# Patient Record
Sex: FEMALE | Race: ASIAN | NOT HISPANIC OR LATINO | URBAN - METROPOLITAN AREA
[De-identification: names, ages, dates, MRNs, and addresses within clinical notes are randomized per-mention and may not be internally consistent; named-entity substitution may affect disease eponyms.]

---

## 2017-09-14 ENCOUNTER — INPATIENT (INPATIENT)
Facility: HOSPITAL | Age: 42
LOS: 2 days | Discharge: HOME | DRG: 690 | End: 2017-09-17
Attending: UROLOGY | Admitting: UROLOGY
Payer: COMMERCIAL

## 2017-09-14 VITALS
SYSTOLIC BLOOD PRESSURE: 169 MMHG | RESPIRATION RATE: 18 BRPM | HEART RATE: 98 BPM | DIASTOLIC BLOOD PRESSURE: 91 MMHG | OXYGEN SATURATION: 95 % | TEMPERATURE: 100 F

## 2017-09-14 DIAGNOSIS — Z98.89 OTHER SPECIFIED POSTPROCEDURAL STATES: Chronic | ICD-10-CM

## 2017-09-14 DIAGNOSIS — Z98.890 OTHER SPECIFIED POSTPROCEDURAL STATES: Chronic | ICD-10-CM

## 2017-09-14 DIAGNOSIS — Z98.891 HISTORY OF UTERINE SCAR FROM PREVIOUS SURGERY: Chronic | ICD-10-CM

## 2017-09-14 LAB
ANION GAP SERPL CALC-SCNC: 17 MMOL/L — SIGNIFICANT CHANGE UP (ref 5–17)
APPEARANCE UR: ABNORMAL
BACTERIA # UR AUTO: ABNORMAL /HPF
BASOPHILS # BLD AUTO: 0 K/UL — SIGNIFICANT CHANGE UP (ref 0–0.2)
BASOPHILS NFR BLD AUTO: 0.2 % — SIGNIFICANT CHANGE UP (ref 0–2)
BILIRUB UR-MCNC: NEGATIVE — SIGNIFICANT CHANGE UP
BUN SERPL-MCNC: 8 MG/DL — SIGNIFICANT CHANGE UP (ref 7–23)
CALCIUM SERPL-MCNC: 10 MG/DL — SIGNIFICANT CHANGE UP (ref 8.4–10.5)
CHLORIDE SERPL-SCNC: 99 MMOL/L — SIGNIFICANT CHANGE UP (ref 96–108)
CO2 SERPL-SCNC: 23 MMOL/L — SIGNIFICANT CHANGE UP (ref 22–31)
COLOR SPEC: SIGNIFICANT CHANGE UP
COMMENT - URINE: SIGNIFICANT CHANGE UP
CREAT SERPL-MCNC: 0.6 MG/DL — SIGNIFICANT CHANGE UP (ref 0.5–1.3)
DIFF PNL FLD: ABNORMAL
EOSINOPHIL # BLD AUTO: 0.2 K/UL — SIGNIFICANT CHANGE UP (ref 0–0.5)
EOSINOPHIL NFR BLD AUTO: 1.2 % — SIGNIFICANT CHANGE UP (ref 0–6)
EPI CELLS # UR: SIGNIFICANT CHANGE UP /HPF
GLUCOSE SERPL-MCNC: 164 MG/DL — HIGH (ref 70–99)
GLUCOSE UR QL: 1000
HCT VFR BLD CALC: 35.5 % — SIGNIFICANT CHANGE UP (ref 34.5–45)
HGB BLD-MCNC: 11.8 G/DL — SIGNIFICANT CHANGE UP (ref 11.5–15.5)
KETONES UR-MCNC: NEGATIVE — SIGNIFICANT CHANGE UP
LEUKOCYTE ESTERASE UR-ACNC: ABNORMAL
LYMPHOCYTES # BLD AUTO: 13.9 % — SIGNIFICANT CHANGE UP (ref 13–44)
LYMPHOCYTES # BLD AUTO: 3 K/UL — SIGNIFICANT CHANGE UP (ref 1–3.3)
MCHC RBC-ENTMCNC: 27.1 PG — SIGNIFICANT CHANGE UP (ref 27–34)
MCHC RBC-ENTMCNC: 33.3 GM/DL — SIGNIFICANT CHANGE UP (ref 32–36)
MCV RBC AUTO: 81.6 FL — SIGNIFICANT CHANGE UP (ref 80–100)
MONOCYTES # BLD AUTO: 1 K/UL — HIGH (ref 0–0.9)
MONOCYTES NFR BLD AUTO: 4.9 % — SIGNIFICANT CHANGE UP (ref 2–14)
NEUTROPHILS # BLD AUTO: 16.9 K/UL — HIGH (ref 1.8–7.4)
NEUTROPHILS NFR BLD AUTO: 79.8 % — HIGH (ref 43–77)
NITRITE UR-MCNC: NEGATIVE — SIGNIFICANT CHANGE UP
PH UR: 6 — SIGNIFICANT CHANGE UP (ref 5–8)
PLATELET # BLD AUTO: 335 K/UL — SIGNIFICANT CHANGE UP (ref 150–400)
POTASSIUM SERPL-MCNC: 4.3 MMOL/L — SIGNIFICANT CHANGE UP (ref 3.5–5.3)
POTASSIUM SERPL-SCNC: 4.3 MMOL/L — SIGNIFICANT CHANGE UP (ref 3.5–5.3)
PROT UR-MCNC: 30 MG/DL
RBC # BLD: 4.35 M/UL — SIGNIFICANT CHANGE UP (ref 3.8–5.2)
RBC # FLD: 13.4 % — SIGNIFICANT CHANGE UP (ref 10.3–14.5)
RBC CASTS # UR COMP ASSIST: ABNORMAL /HPF (ref 0–2)
SODIUM SERPL-SCNC: 139 MMOL/L — SIGNIFICANT CHANGE UP (ref 135–145)
SP GR SPEC: 1.01 — LOW (ref 1.01–1.02)
UROBILINOGEN FLD QL: NEGATIVE — SIGNIFICANT CHANGE UP
WBC # BLD: 21.2 K/UL — HIGH (ref 3.8–10.5)
WBC # FLD AUTO: 21.2 K/UL — HIGH (ref 3.8–10.5)
WBC UR QL: ABNORMAL /HPF (ref 0–5)

## 2017-09-14 PROCEDURE — 74176 CT ABD & PELVIS W/O CONTRAST: CPT | Mod: 26

## 2017-09-14 PROCEDURE — 99285 EMERGENCY DEPT VISIT HI MDM: CPT

## 2017-09-14 RX ORDER — FAMOTIDINE 10 MG/ML
20 INJECTION INTRAVENOUS ONCE
Qty: 0 | Refills: 0 | Status: COMPLETED | OUTPATIENT
Start: 2017-09-14 | End: 2017-09-14

## 2017-09-14 RX ORDER — CEFTRIAXONE 500 MG/1
1 INJECTION, POWDER, FOR SOLUTION INTRAMUSCULAR; INTRAVENOUS ONCE
Qty: 0 | Refills: 0 | Status: COMPLETED | OUTPATIENT
Start: 2017-09-14 | End: 2017-09-14

## 2017-09-14 RX ORDER — KETOROLAC TROMETHAMINE 30 MG/ML
15 SYRINGE (ML) INJECTION ONCE
Qty: 0 | Refills: 0 | Status: DISCONTINUED | OUTPATIENT
Start: 2017-09-14 | End: 2017-09-14

## 2017-09-14 RX ORDER — SODIUM CHLORIDE 9 MG/ML
1000 INJECTION INTRAMUSCULAR; INTRAVENOUS; SUBCUTANEOUS ONCE
Qty: 0 | Refills: 0 | Status: COMPLETED | OUTPATIENT
Start: 2017-09-14 | End: 2017-09-14

## 2017-09-14 RX ADMIN — CEFTRIAXONE 100 GRAM(S): 500 INJECTION, POWDER, FOR SOLUTION INTRAMUSCULAR; INTRAVENOUS at 23:49

## 2017-09-14 RX ADMIN — FAMOTIDINE 20 MILLIGRAM(S): 10 INJECTION INTRAVENOUS at 22:10

## 2017-09-14 RX ADMIN — Medication 15 MILLIGRAM(S): at 22:10

## 2017-09-14 RX ADMIN — SODIUM CHLORIDE 1000 MILLILITER(S): 9 INJECTION INTRAMUSCULAR; INTRAVENOUS; SUBCUTANEOUS at 22:11

## 2017-09-14 RX ADMIN — Medication 15 MILLIGRAM(S): at 23:49

## 2017-09-14 NOTE — ED PROVIDER NOTE - CARE PLAN
Principal Discharge DX:	Kidney stones Principal Discharge DX:	Kidney stones  Secondary Diagnosis:	Acute cystitis with hematuria

## 2017-09-14 NOTE — ED PROVIDER NOTE - PROGRESS NOTE DETAILS
Angelica: Patient wbc elevated, ua shows wbc, + leuk esterace, +bacteria. + rbc. will consult urology. ct scan to evaluate for infected stone if need for possible further intervention.

## 2017-09-14 NOTE — ED PROVIDER NOTE - MEDICAL DECISION MAKING DETAILS
Angelica: patient with left flank pain radiating to the groin x 5 days worse today. Had blood in urine 2 weeks ago but now with dysuria. + subjective fevers at home. History of stones in the past. No vaginal discharge. will get labs, ua, U/S reassess.

## 2017-09-14 NOTE — ED ADULT NURSE NOTE - OBJECTIVE STATEMENT
patient presented to the ED w left side flank pain, symptoms intermittent for five days now but worsens today. reports some nausea, no vomiting. radiate to suprapubic area.

## 2017-09-14 NOTE — ED PROVIDER NOTE - OBJECTIVE STATEMENT
42 year old female w DM, Abdominal hernia, prior kidney stone reports 5 day onset of left flank pain radiating to the left groin. She also admits to nausea which is chronic and intermittent associated with the sensation of smelling smoke and frequent belching. She denies fever or chills. No other complaints. Denies dysuria.

## 2017-09-15 ENCOUNTER — TRANSCRIPTION ENCOUNTER (OUTPATIENT)
Age: 42
End: 2017-09-15

## 2017-09-15 DIAGNOSIS — N20.0 CALCULUS OF KIDNEY: ICD-10-CM

## 2017-09-15 LAB
ANION GAP SERPL CALC-SCNC: 13 MMOL/L — SIGNIFICANT CHANGE UP (ref 5–17)
BASOPHILS # BLD AUTO: 0.1 K/UL — SIGNIFICANT CHANGE UP (ref 0–0.2)
BASOPHILS NFR BLD AUTO: 0.4 % — SIGNIFICANT CHANGE UP (ref 0–2)
BUN SERPL-MCNC: 8 MG/DL — SIGNIFICANT CHANGE UP (ref 7–23)
CALCIUM SERPL-MCNC: 9.1 MG/DL — SIGNIFICANT CHANGE UP (ref 8.4–10.5)
CHLORIDE SERPL-SCNC: 103 MMOL/L — SIGNIFICANT CHANGE UP (ref 96–108)
CO2 SERPL-SCNC: 23 MMOL/L — SIGNIFICANT CHANGE UP (ref 22–31)
CREAT SERPL-MCNC: 0.57 MG/DL — SIGNIFICANT CHANGE UP (ref 0.5–1.3)
EOSINOPHIL # BLD AUTO: 0.3 K/UL — SIGNIFICANT CHANGE UP (ref 0–0.5)
EOSINOPHIL NFR BLD AUTO: 1.6 % — SIGNIFICANT CHANGE UP (ref 0–6)
GLUCOSE SERPL-MCNC: 158 MG/DL — HIGH (ref 70–99)
HCT VFR BLD CALC: 31.4 % — LOW (ref 34.5–45)
HCT VFR BLD CALC: 33.1 % — LOW (ref 34.5–45)
HGB BLD-MCNC: 10.3 G/DL — LOW (ref 11.5–15.5)
HGB BLD-MCNC: 10.9 G/DL — LOW (ref 11.5–15.5)
LYMPHOCYTES # BLD AUTO: 17.7 % — SIGNIFICANT CHANGE UP (ref 13–44)
LYMPHOCYTES # BLD AUTO: 2.8 K/UL — SIGNIFICANT CHANGE UP (ref 1–3.3)
MCHC RBC-ENTMCNC: 25.9 PG — LOW (ref 27–34)
MCHC RBC-ENTMCNC: 27.1 PG — SIGNIFICANT CHANGE UP (ref 27–34)
MCHC RBC-ENTMCNC: 32.8 GM/DL — SIGNIFICANT CHANGE UP (ref 32–36)
MCHC RBC-ENTMCNC: 33 GM/DL — SIGNIFICANT CHANGE UP (ref 32–36)
MCV RBC AUTO: 78.9 FL — LOW (ref 80–100)
MCV RBC AUTO: 82.1 FL — SIGNIFICANT CHANGE UP (ref 80–100)
MONOCYTES # BLD AUTO: 1.1 K/UL — HIGH (ref 0–0.9)
MONOCYTES NFR BLD AUTO: 6.8 % — SIGNIFICANT CHANGE UP (ref 2–14)
NEUTROPHILS # BLD AUTO: 11.7 K/UL — HIGH (ref 1.8–7.4)
NEUTROPHILS NFR BLD AUTO: 73.5 % — SIGNIFICANT CHANGE UP (ref 43–77)
PLATELET # BLD AUTO: 301 K/UL — SIGNIFICANT CHANGE UP (ref 150–400)
POTASSIUM SERPL-MCNC: 3.9 MMOL/L — SIGNIFICANT CHANGE UP (ref 3.5–5.3)
POTASSIUM SERPL-SCNC: 3.9 MMOL/L — SIGNIFICANT CHANGE UP (ref 3.5–5.3)
RBC # BLD: 3.98 M/UL — SIGNIFICANT CHANGE UP (ref 3.8–5.2)
RBC # BLD: 4.04 M/UL — SIGNIFICANT CHANGE UP (ref 3.8–5.2)
RBC # FLD: 13.4 % — SIGNIFICANT CHANGE UP (ref 10.3–14.5)
RBC # FLD: 15 % — HIGH (ref 10.3–14.5)
SODIUM SERPL-SCNC: 139 MMOL/L — SIGNIFICANT CHANGE UP (ref 135–145)
WBC # BLD: 15.89 K/UL — HIGH (ref 3.8–10.5)
WBC # BLD: 15.9 K/UL — HIGH (ref 3.8–10.5)
WBC # FLD AUTO: 15.89 K/UL — HIGH (ref 3.8–10.5)
WBC # FLD AUTO: 15.9 K/UL — HIGH (ref 3.8–10.5)

## 2017-09-15 PROCEDURE — 52332 CYSTOSCOPY AND TREATMENT: CPT | Mod: LT

## 2017-09-15 PROCEDURE — 76775 US EXAM ABDO BACK WALL LIM: CPT | Mod: 26

## 2017-09-15 RX ORDER — ACETAMINOPHEN 500 MG
650 TABLET ORAL EVERY 6 HOURS
Qty: 0 | Refills: 0 | Status: DISCONTINUED | OUTPATIENT
Start: 2017-09-15 | End: 2017-09-17

## 2017-09-15 RX ORDER — ACETAMINOPHEN 500 MG
650 TABLET ORAL EVERY 6 HOURS
Qty: 0 | Refills: 0 | Status: DISCONTINUED | OUTPATIENT
Start: 2017-09-15 | End: 2017-09-15

## 2017-09-15 RX ORDER — DEXTROSE 50 % IN WATER 50 %
1 SYRINGE (ML) INTRAVENOUS ONCE
Qty: 0 | Refills: 0 | Status: DISCONTINUED | OUTPATIENT
Start: 2017-09-15 | End: 2017-09-15

## 2017-09-15 RX ORDER — DEXTROSE 50 % IN WATER 50 %
25 SYRINGE (ML) INTRAVENOUS ONCE
Qty: 0 | Refills: 0 | Status: DISCONTINUED | OUTPATIENT
Start: 2017-09-15 | End: 2017-09-17

## 2017-09-15 RX ORDER — INSULIN LISPRO 100/ML
VIAL (ML) SUBCUTANEOUS
Qty: 0 | Refills: 0 | Status: DISCONTINUED | OUTPATIENT
Start: 2017-09-15 | End: 2017-09-15

## 2017-09-15 RX ORDER — HYDROMORPHONE HYDROCHLORIDE 2 MG/ML
0.25 INJECTION INTRAMUSCULAR; INTRAVENOUS; SUBCUTANEOUS
Qty: 0 | Refills: 0 | Status: DISCONTINUED | OUTPATIENT
Start: 2017-09-15 | End: 2017-09-15

## 2017-09-15 RX ORDER — INSULIN LISPRO 100/ML
VIAL (ML) SUBCUTANEOUS
Qty: 0 | Refills: 0 | Status: DISCONTINUED | OUTPATIENT
Start: 2017-09-15 | End: 2017-09-17

## 2017-09-15 RX ORDER — SODIUM CHLORIDE 9 MG/ML
1000 INJECTION INTRAMUSCULAR; INTRAVENOUS; SUBCUTANEOUS
Qty: 0 | Refills: 0 | Status: DISCONTINUED | OUTPATIENT
Start: 2017-09-15 | End: 2017-09-16

## 2017-09-15 RX ORDER — SODIUM CHLORIDE 9 MG/ML
1000 INJECTION, SOLUTION INTRAVENOUS
Qty: 0 | Refills: 0 | Status: DISCONTINUED | OUTPATIENT
Start: 2017-09-15 | End: 2017-09-15

## 2017-09-15 RX ORDER — GLUCAGON INJECTION, SOLUTION 0.5 MG/.1ML
1 INJECTION, SOLUTION SUBCUTANEOUS ONCE
Qty: 0 | Refills: 0 | Status: DISCONTINUED | OUTPATIENT
Start: 2017-09-15 | End: 2017-09-15

## 2017-09-15 RX ORDER — MORPHINE SULFATE 50 MG/1
4 CAPSULE, EXTENDED RELEASE ORAL EVERY 4 HOURS
Qty: 0 | Refills: 0 | Status: DISCONTINUED | OUTPATIENT
Start: 2017-09-15 | End: 2017-09-15

## 2017-09-15 RX ORDER — DEXTROSE 50 % IN WATER 50 %
1 SYRINGE (ML) INTRAVENOUS ONCE
Qty: 0 | Refills: 0 | Status: DISCONTINUED | OUTPATIENT
Start: 2017-09-15 | End: 2017-09-17

## 2017-09-15 RX ORDER — GLUCAGON INJECTION, SOLUTION 0.5 MG/.1ML
1 INJECTION, SOLUTION SUBCUTANEOUS ONCE
Qty: 0 | Refills: 0 | Status: DISCONTINUED | OUTPATIENT
Start: 2017-09-15 | End: 2017-09-17

## 2017-09-15 RX ORDER — HEPARIN SODIUM 5000 [USP'U]/ML
5000 INJECTION INTRAVENOUS; SUBCUTANEOUS EVERY 8 HOURS
Qty: 0 | Refills: 0 | Status: DISCONTINUED | OUTPATIENT
Start: 2017-09-15 | End: 2017-09-17

## 2017-09-15 RX ORDER — DEXTROSE 50 % IN WATER 50 %
12.5 SYRINGE (ML) INTRAVENOUS ONCE
Qty: 0 | Refills: 0 | Status: DISCONTINUED | OUTPATIENT
Start: 2017-09-15 | End: 2017-09-17

## 2017-09-15 RX ORDER — TAMSULOSIN HYDROCHLORIDE 0.4 MG/1
0.4 CAPSULE ORAL AT BEDTIME
Qty: 0 | Refills: 0 | Status: DISCONTINUED | OUTPATIENT
Start: 2017-09-15 | End: 2017-09-15

## 2017-09-15 RX ORDER — DEXTROSE 50 % IN WATER 50 %
25 SYRINGE (ML) INTRAVENOUS ONCE
Qty: 0 | Refills: 0 | Status: DISCONTINUED | OUTPATIENT
Start: 2017-09-15 | End: 2017-09-15

## 2017-09-15 RX ORDER — SODIUM CHLORIDE 9 MG/ML
1000 INJECTION, SOLUTION INTRAVENOUS
Qty: 0 | Refills: 0 | Status: DISCONTINUED | OUTPATIENT
Start: 2017-09-15 | End: 2017-09-17

## 2017-09-15 RX ORDER — CEFTRIAXONE 500 MG/1
1 INJECTION, POWDER, FOR SOLUTION INTRAMUSCULAR; INTRAVENOUS EVERY 24 HOURS
Qty: 0 | Refills: 0 | Status: DISCONTINUED | OUTPATIENT
Start: 2017-09-15 | End: 2017-09-15

## 2017-09-15 RX ORDER — INSULIN LISPRO 100/ML
VIAL (ML) SUBCUTANEOUS AT BEDTIME
Qty: 0 | Refills: 0 | Status: DISCONTINUED | OUTPATIENT
Start: 2017-09-15 | End: 2017-09-17

## 2017-09-15 RX ORDER — MORPHINE SULFATE 50 MG/1
4 CAPSULE, EXTENDED RELEASE ORAL EVERY 4 HOURS
Qty: 0 | Refills: 0 | Status: DISCONTINUED | OUTPATIENT
Start: 2017-09-15 | End: 2017-09-17

## 2017-09-15 RX ORDER — OXYCODONE AND ACETAMINOPHEN 5; 325 MG/1; MG/1
2 TABLET ORAL EVERY 4 HOURS
Qty: 0 | Refills: 0 | Status: DISCONTINUED | OUTPATIENT
Start: 2017-09-15 | End: 2017-09-15

## 2017-09-15 RX ORDER — INSULIN LISPRO 100/ML
VIAL (ML) SUBCUTANEOUS AT BEDTIME
Qty: 0 | Refills: 0 | Status: DISCONTINUED | OUTPATIENT
Start: 2017-09-15 | End: 2017-09-15

## 2017-09-15 RX ORDER — DEXTROSE 50 % IN WATER 50 %
12.5 SYRINGE (ML) INTRAVENOUS ONCE
Qty: 0 | Refills: 0 | Status: DISCONTINUED | OUTPATIENT
Start: 2017-09-15 | End: 2017-09-15

## 2017-09-15 RX ORDER — ONDANSETRON 8 MG/1
4 TABLET, FILM COATED ORAL ONCE
Qty: 0 | Refills: 0 | Status: DISCONTINUED | OUTPATIENT
Start: 2017-09-15 | End: 2017-09-15

## 2017-09-15 RX ORDER — SODIUM CHLORIDE 9 MG/ML
1000 INJECTION INTRAMUSCULAR; INTRAVENOUS; SUBCUTANEOUS
Qty: 0 | Refills: 0 | Status: DISCONTINUED | OUTPATIENT
Start: 2017-09-15 | End: 2017-09-15

## 2017-09-15 RX ADMIN — MORPHINE SULFATE 4 MILLIGRAM(S): 50 CAPSULE, EXTENDED RELEASE ORAL at 07:39

## 2017-09-15 RX ADMIN — Medication 1: at 23:27

## 2017-09-15 RX ADMIN — HEPARIN SODIUM 5000 UNIT(S): 5000 INJECTION INTRAVENOUS; SUBCUTANEOUS at 23:27

## 2017-09-15 RX ADMIN — MORPHINE SULFATE 4 MILLIGRAM(S): 50 CAPSULE, EXTENDED RELEASE ORAL at 23:50

## 2017-09-15 RX ADMIN — MORPHINE SULFATE 4 MILLIGRAM(S): 50 CAPSULE, EXTENDED RELEASE ORAL at 23:20

## 2017-09-15 RX ADMIN — SODIUM CHLORIDE 75 MILLILITER(S): 9 INJECTION INTRAMUSCULAR; INTRAVENOUS; SUBCUTANEOUS at 03:18

## 2017-09-15 RX ADMIN — Medication 1: at 16:41

## 2017-09-15 RX ADMIN — MORPHINE SULFATE 4 MILLIGRAM(S): 50 CAPSULE, EXTENDED RELEASE ORAL at 08:00

## 2017-09-15 NOTE — DISCHARGE NOTE ADULT - MEDICATION SUMMARY - MEDICATIONS TO TAKE
I will START or STAY ON the medications listed below when I get home from the hospital:    sulfamethoxazole-trimethoprim 800 mg-160 mg oral tablet  -- 1 tab(s) by mouth 2 times a day  -- Indication: For antibiotic

## 2017-09-15 NOTE — PROGRESS NOTE ADULT - PROBLEM SELECTOR PLAN 1
-regular diet  -monitor I's and O's   -watch for fevers  -OOB, DVT prophylaxis  -incentive spirometry

## 2017-09-15 NOTE — H&P ADULT - ASSESSMENT
41 y/o female PMHx DM with renal colic secondary to 9mm right upj calculus    Plan:    admit gu  f/u ucx  cont iv abx  npo  am labs  possible OR today

## 2017-09-15 NOTE — DISCHARGE NOTE ADULT - CARE PLAN
Principal Discharge DX:	Kidney stones  Goal:	you had a stent placed  Instructions for follow-up, activity and diet:	Call the office if you experience fever, chills, uncontrolled pain, the inability to tolerate liquids, or the urine does not flow  Please follow up with Dr. Quispe in one week for further stent and stone management Principal Discharge DX:	Kidney stones  Goal:	you had a stent placed  Instructions for follow-up, activity and diet:	Call the office if you experience fever, chills, uncontrolled pain, the inability to tolerate liquids, or the urine does not flow.   Please follow up with Dr. Quispe in one week for further stent and stone management

## 2017-09-15 NOTE — H&P ADULT - HISTORY OF PRESENT ILLNESS
41 y/o female PMHx DM, nephrolithiasis presents to ED c/o 5 day history of left flank pain.  +nausea, no emesis  +subjective fever, and +chills  pt admits to some dysuria x today and gross hematuria few weeks ago that resolved spontaneously.  pt with one prior episode of renal colic back in 2014, no surgical intervention, was able to pass stone on her own.

## 2017-09-15 NOTE — BRIEF OPERATIVE NOTE - PROCEDURE
<<-----Click on this checkbox to enter Procedure Cystoscopy with insertion of ureteral stent  09/15/2017    Active  [id number]

## 2017-09-15 NOTE — DISCHARGE NOTE ADULT - PLAN OF CARE
you had a stent placed Call the office if you experience fever, chills, uncontrolled pain, the inability to tolerate liquids, or the urine does not flow  Please follow up with Dr. Quispe in one week for further stent and stone management Call the office if you experience fever, chills, uncontrolled pain, the inability to tolerate liquids, or the urine does not flow.   Please follow up with Dr. Quispe in one week for further stent and stone management

## 2017-09-15 NOTE — DISCHARGE NOTE ADULT - CARE PROVIDER_API CALL
Colt Quispe (MD), Urology  [address] [place] [address]  [address] [place] [address]  Phone: [phone]  Fax: [phone]

## 2017-09-15 NOTE — DISCHARGE NOTE ADULT - HOSPITAL COURSE
pt is a 41 yo f with a pmh of dm2 and nephrolithiasis who arrived at [facility] on 9/14 with left renal colic and unrelenting pain. She required a ureteral stent placement and was instructed to fu for further stent and stone management pt is a 41 yo f with a pmh of dm2 and nephrolithiasis who arrived at [facility] on 9/14 with left renal colic and unrelenting pain. She required a ureteral stent placement and was instructed to fu for further stent and stone management . AVSS. d/c with 7days bactrim for staph in urine.

## 2017-09-15 NOTE — PROGRESS NOTE ADULT - SUBJECTIVE AND OBJECTIVE BOX
The patient was seen and examined at bedside.  Admits to dysuria.  She denies complaints of chest pain, shortness of breath, nausea, acute pain.    T(C): 37.2 (09-15-17 @ 20:32), Max: 37.6 (09-14-17 @ 23:07)  HR: 85 (09-15-17 @ 20:32) (80 - 107)  BP: 116/73 (09-15-17 @ 20:32) (109/71 - 148/84)  RR: 18 (09-15-17 @ 20:32) (15 - 20)  SpO2: 94% (09-15-17 @ 20:32) (93% - 99%)  Wt(kg): --    Physical Exam:    General: NAD, A+Ox3  Abdomen: soft, non-tender, non-distended      09-14 @ 07:01  -  09-15 @ 07:00  --------------------------------------------------------  IN: 1000 mL / OUT: 0 mL / NET: 1000 mL    09-15 @ 07:01  -  09-15 @ 21:00  --------------------------------------------------------  IN: 300 mL / OUT: 1300 mL / NET: -1000 mL    voiding                          10.3   15.89 )-----------( x        ( 15 Sep 2017 07:18 )             31.4       09-15    139  |  103  |  8   ----------------------------<  158<H>  3.9   |  23  |  0.57    Ca    9.1      15 Sep 2017 05:11

## 2017-09-15 NOTE — H&P ADULT - NSHPLABSRESULTS_GEN_ALL_CORE
11.8   21.2  )-----------( 335      ( 14 Sep 2017 22:08 )             35.5     09-14    139  |  99  |  8   ----------------------------<  164<H>  4.3   |  23  |  0.60    Ca    10.0      14 Sep 2017 22:08        CT: 9mm L upj calculus with hydronephrosis

## 2017-09-15 NOTE — DISCHARGE NOTE ADULT - ADDITIONAL INSTRUCTIONS
complete entire course of antibiotics.   You must follow up as an outpatient for definitive stone management and stent removal. call for appt.

## 2017-09-15 NOTE — DISCHARGE NOTE ADULT - PATIENT PORTAL LINK FT
“You can access the [facility] Patient Portal, offered by [facility], by registering with the following website: [email or web address]”

## 2017-09-15 NOTE — ED ADULT NURSE REASSESSMENT NOTE - NS ED NURSE REASSESS COMMENT FT1
patient reassessed. no complaint of pain at this time. plan of care reviewed with patient, antibiotics administered as ordered.
patient observed resting in bed. patient is axo3. patient is able to verbalize need. no indication of pain or discomfort. VS reassessed. VS stable. IV fluid infusing as ordered via pump. AM labs drawn and sent to the lab. plan of care reviewed with patient. patient is awaiting bed in inpatient unit.

## 2017-09-16 ENCOUNTER — TRANSCRIPTION ENCOUNTER (OUTPATIENT)
Age: 42
End: 2017-09-16

## 2017-09-16 LAB
-  AMPICILLIN/SULBACTAM: SIGNIFICANT CHANGE UP
-  CEFAZOLIN: SIGNIFICANT CHANGE UP
-  CIPROFLOXACIN: SIGNIFICANT CHANGE UP
-  GENTAMICIN: SIGNIFICANT CHANGE UP
-  LEVOFLOXACIN: SIGNIFICANT CHANGE UP
-  NITROFURANTOIN: SIGNIFICANT CHANGE UP
-  OXACILLIN: SIGNIFICANT CHANGE UP
-  PENICILLIN: SIGNIFICANT CHANGE UP
-  RIFAMPIN: SIGNIFICANT CHANGE UP
-  TETRACYCLINE: SIGNIFICANT CHANGE UP
-  TRIMETHOPRIM/SULFAMETHOXAZOLE: SIGNIFICANT CHANGE UP
-  VANCOMYCIN: SIGNIFICANT CHANGE UP
BASOPHILS # BLD AUTO: 0.01 K/UL — SIGNIFICANT CHANGE UP (ref 0–0.2)
BASOPHILS NFR BLD AUTO: 0.1 % — SIGNIFICANT CHANGE UP (ref 0–2)
CULTURE RESULTS: SIGNIFICANT CHANGE UP
EOSINOPHIL # BLD AUTO: 0.01 K/UL — SIGNIFICANT CHANGE UP (ref 0–0.5)
EOSINOPHIL NFR BLD AUTO: 0.1 % — SIGNIFICANT CHANGE UP (ref 0–6)
HBA1C BLD-MCNC: 7.2 % — HIGH (ref 4–5.6)
HCT VFR BLD CALC: 31.8 % — LOW (ref 34.5–45)
HGB BLD-MCNC: 10.2 G/DL — LOW (ref 11.5–15.5)
IMM GRANULOCYTES NFR BLD AUTO: 0.3 % — SIGNIFICANT CHANGE UP (ref 0–1.5)
LYMPHOCYTES # BLD AUTO: 1.69 K/UL — SIGNIFICANT CHANGE UP (ref 1–3.3)
LYMPHOCYTES # BLD AUTO: 11.3 % — LOW (ref 13–44)
MCHC RBC-ENTMCNC: 25.6 PG — LOW (ref 27–34)
MCHC RBC-ENTMCNC: 32.1 GM/DL — SIGNIFICANT CHANGE UP (ref 32–36)
MCV RBC AUTO: 79.9 FL — LOW (ref 80–100)
METHOD TYPE: SIGNIFICANT CHANGE UP
MONOCYTES # BLD AUTO: 0.74 K/UL — SIGNIFICANT CHANGE UP (ref 0–0.9)
MONOCYTES NFR BLD AUTO: 5 % — SIGNIFICANT CHANGE UP (ref 2–14)
NEUTROPHILS # BLD AUTO: 12.43 K/UL — HIGH (ref 1.8–7.4)
NEUTROPHILS NFR BLD AUTO: 83.2 % — HIGH (ref 43–77)
ORGANISM # SPEC MICROSCOPIC CNT: SIGNIFICANT CHANGE UP
ORGANISM # SPEC MICROSCOPIC CNT: SIGNIFICANT CHANGE UP
PLATELET # BLD AUTO: 334 K/UL — SIGNIFICANT CHANGE UP (ref 150–400)
RBC # BLD: 3.98 M/UL — SIGNIFICANT CHANGE UP (ref 3.8–5.2)
RBC # FLD: 14.8 % — HIGH (ref 10.3–14.5)
SPECIMEN SOURCE: SIGNIFICANT CHANGE UP
WBC # BLD: 14.92 K/UL — HIGH (ref 3.8–10.5)
WBC # FLD AUTO: 14.92 K/UL — HIGH (ref 3.8–10.5)

## 2017-09-16 RX ORDER — DOCUSATE SODIUM 100 MG
100 CAPSULE ORAL THREE TIMES A DAY
Qty: 0 | Refills: 0 | Status: DISCONTINUED | OUTPATIENT
Start: 2017-09-16 | End: 2017-09-17

## 2017-09-16 RX ORDER — SENNA PLUS 8.6 MG/1
2 TABLET ORAL AT BEDTIME
Qty: 0 | Refills: 0 | Status: DISCONTINUED | OUTPATIENT
Start: 2017-09-16 | End: 2017-09-17

## 2017-09-16 RX ORDER — POLYETHYLENE GLYCOL 3350 17 G/17G
17 POWDER, FOR SOLUTION ORAL ONCE
Qty: 0 | Refills: 0 | Status: COMPLETED | OUTPATIENT
Start: 2017-09-16 | End: 2017-09-16

## 2017-09-16 RX ADMIN — Medication 100 MILLIGRAM(S): at 22:02

## 2017-09-16 RX ADMIN — SENNA PLUS 2 TABLET(S): 8.6 TABLET ORAL at 22:02

## 2017-09-16 RX ADMIN — HEPARIN SODIUM 5000 UNIT(S): 5000 INJECTION INTRAVENOUS; SUBCUTANEOUS at 13:17

## 2017-09-16 RX ADMIN — Medication 1 TABLET(S): at 17:35

## 2017-09-16 RX ADMIN — Medication 1 TABLET(S): at 05:20

## 2017-09-16 RX ADMIN — Medication 1: at 17:34

## 2017-09-16 RX ADMIN — HEPARIN SODIUM 5000 UNIT(S): 5000 INJECTION INTRAVENOUS; SUBCUTANEOUS at 22:02

## 2017-09-16 RX ADMIN — Medication 1: at 08:48

## 2017-09-16 RX ADMIN — HEPARIN SODIUM 5000 UNIT(S): 5000 INJECTION INTRAVENOUS; SUBCUTANEOUS at 05:20

## 2017-09-16 NOTE — PROGRESS NOTE ADULT - ASSESSMENT
42y Female s/p cystoscopy, left ureteral stent placement for left obstructing ureteral stone and hydronephrosis  -regular diet  -monitor I's and O's   -trend fevers  -f/u culture results.  -OOB, DVT prophylaxis  -incentive spirometry  -d/c planning

## 2017-09-16 NOTE — PROGRESS NOTE ADULT - SUBJECTIVE AND OBJECTIVE BOX
The patient was seen and examined at bedside. Feels well this AM. No complaints currently. No fevers o/n, no n/v.    T(C): 36.6 (09-16-17 @ 04:33), Max: 37.4 (09-15-17 @ 12:54)  HR: 77 (09-16-17 @ 04:33) (77 - 107)  BP: 122/67 (09-16-17 @ 04:33) (109/71 - 147/73)  ABP: --  ABP(mean): --  RR: 18 (09-16-17 @ 04:33) (15 - 20)  SpO2: 94% (09-16-17 @ 04:33) (93% - 99%)  Wt(kg): --  CVP(mm Hg): --      09-15 @ 07:01  -  09-16 @ 07:00  --------------------------------------------------------  IN:    Oral Fluid: 320 mL    sodium chloride 0.9%.: 300 mL  Total IN: 620 mL    OUT:    Voided: 1300 mL  Total OUT: 1300 mL    Total NET: -680 mL        Physical Exam:    General: NAD, A+Ox3  Abdomen: soft, non-tender, non-distended

## 2017-09-16 NOTE — PATIENT PROFILE ADULT. - NS TRANSFER PATIENT BELONGINGS
Jewelry/Money (specify)/2 bangles, 1 set earring, 1 chain, 1 ring./Clothing/Cell Phone/PDA (specify)

## 2017-09-16 NOTE — PATIENT PROFILE ADULT. - LANGUAGE ASSISTANCE NEEDED
pt able to speak english well/No-Patient/Caregiver offered and refused free interpretation services.

## 2017-09-17 VITALS
HEART RATE: 71 BPM | DIASTOLIC BLOOD PRESSURE: 92 MMHG | TEMPERATURE: 98 F | SYSTOLIC BLOOD PRESSURE: 150 MMHG | RESPIRATION RATE: 15 BRPM | OXYGEN SATURATION: 95 %

## 2017-09-17 LAB
-  AMPICILLIN/SULBACTAM: SIGNIFICANT CHANGE UP
-  CEFAZOLIN: SIGNIFICANT CHANGE UP
-  CIPROFLOXACIN: SIGNIFICANT CHANGE UP
-  GENTAMICIN: SIGNIFICANT CHANGE UP
-  LEVOFLOXACIN: SIGNIFICANT CHANGE UP
-  NITROFURANTOIN: SIGNIFICANT CHANGE UP
-  OXACILLIN: SIGNIFICANT CHANGE UP
-  PENICILLIN: SIGNIFICANT CHANGE UP
-  RIFAMPIN: SIGNIFICANT CHANGE UP
-  TETRACYCLINE: SIGNIFICANT CHANGE UP
-  TRIMETHOPRIM/SULFAMETHOXAZOLE: SIGNIFICANT CHANGE UP
-  VANCOMYCIN: SIGNIFICANT CHANGE UP
CULTURE RESULTS: SIGNIFICANT CHANGE UP
HCT VFR BLD CALC: 29.4 % — LOW (ref 34.5–45)
HGB BLD-MCNC: 9.5 G/DL — LOW (ref 11.5–15.5)
MCHC RBC-ENTMCNC: 26 PG — LOW (ref 27–34)
MCHC RBC-ENTMCNC: 32.3 GM/DL — SIGNIFICANT CHANGE UP (ref 32–36)
MCV RBC AUTO: 80.3 FL — SIGNIFICANT CHANGE UP (ref 80–100)
METHOD TYPE: SIGNIFICANT CHANGE UP
ORGANISM # SPEC MICROSCOPIC CNT: SIGNIFICANT CHANGE UP
ORGANISM # SPEC MICROSCOPIC CNT: SIGNIFICANT CHANGE UP
PLATELET # BLD AUTO: 327 K/UL — SIGNIFICANT CHANGE UP (ref 150–400)
RBC # BLD: 3.66 M/UL — LOW (ref 3.8–5.2)
RBC # FLD: 15.5 % — HIGH (ref 10.3–14.5)
SPECIMEN SOURCE: SIGNIFICANT CHANGE UP
WBC # BLD: 11.83 K/UL — HIGH (ref 3.8–10.5)
WBC # FLD AUTO: 11.83 K/UL — HIGH (ref 3.8–10.5)

## 2017-09-17 PROCEDURE — C1758: CPT

## 2017-09-17 PROCEDURE — 80048 BASIC METABOLIC PNL TOTAL CA: CPT

## 2017-09-17 PROCEDURE — 76000 FLUOROSCOPY <1 HR PHYS/QHP: CPT

## 2017-09-17 PROCEDURE — 74176 CT ABD & PELVIS W/O CONTRAST: CPT

## 2017-09-17 PROCEDURE — C1769: CPT

## 2017-09-17 PROCEDURE — 87186 SC STD MICRODIL/AGAR DIL: CPT

## 2017-09-17 PROCEDURE — 85027 COMPLETE CBC AUTOMATED: CPT

## 2017-09-17 PROCEDURE — 81001 URINALYSIS AUTO W/SCOPE: CPT

## 2017-09-17 PROCEDURE — 83036 HEMOGLOBIN GLYCOSYLATED A1C: CPT

## 2017-09-17 PROCEDURE — 96375 TX/PRO/DX INJ NEW DRUG ADDON: CPT

## 2017-09-17 PROCEDURE — G0307: CPT

## 2017-09-17 PROCEDURE — 93010 ELECTROCARDIOGRAM REPORT: CPT

## 2017-09-17 PROCEDURE — 99285 EMERGENCY DEPT VISIT HI MDM: CPT | Mod: 25

## 2017-09-17 PROCEDURE — 81025 URINE PREGNANCY TEST: CPT

## 2017-09-17 PROCEDURE — 87086 URINE CULTURE/COLONY COUNT: CPT

## 2017-09-17 PROCEDURE — 96374 THER/PROPH/DIAG INJ IV PUSH: CPT

## 2017-09-17 PROCEDURE — C2617: CPT

## 2017-09-17 PROCEDURE — 76775 US EXAM ABDO BACK WALL LIM: CPT

## 2017-09-17 PROCEDURE — 93005 ELECTROCARDIOGRAM TRACING: CPT

## 2017-09-17 RX ORDER — BENZOCAINE AND MENTHOL 5; 1 G/100ML; G/100ML
1 LIQUID ORAL
Qty: 0 | Refills: 0 | Status: DISCONTINUED | OUTPATIENT
Start: 2017-09-17 | End: 2017-09-17

## 2017-09-17 RX ADMIN — BENZOCAINE AND MENTHOL 1 LOZENGE: 5; 1 LIQUID ORAL at 08:26

## 2017-09-17 RX ADMIN — Medication 1 TABLET(S): at 05:53

## 2017-09-17 RX ADMIN — HEPARIN SODIUM 5000 UNIT(S): 5000 INJECTION INTRAVENOUS; SUBCUTANEOUS at 05:53

## 2017-09-17 RX ADMIN — Medication 100 MILLIGRAM(S): at 05:53

## 2017-09-17 NOTE — PROGRESS NOTE ADULT - SUBJECTIVE AND OBJECTIVE BOX
called by RN, pt complaining of chest pain. Patient states had a sore throat this morning , then pain moved to back of neck, now mid chest pain. Pain is more with movement and deep breathing. Pain is reproducible with palpation of sternum. denies SOB or occurrences similar in past. AVSS. EKG sinus tach.     reassured patient that it seems like muscular pain, no further work up needed at this time. called by RN, pt complaining of chest pain. Patient states had a sore throat this morning , then pain moved to back of neck, now mid chest pain. Pain is more with movement and deep breathing. Pain is reproducible with palpation of sternum. denies SOB or occurrences similar in past. AVSS. EKG sinus tach.     reassured patient that it seems like muscular pain/ costochondritis, no further work up needed at this time. called by RN, pt complaining of chest pain. Patient states had a sore throat this morning , then pain moved to back of neck, now mid chest pain. Pain is more with movement and deep breathing. Pain is reproducible with palpation of sternum. denies SOB or occurrences similar in past. AVSS. EKG normal sinus rhythm.     reassured patient that it seems like muscular pain/ costochondritis, no further work up needed at this time.

## 2017-09-17 NOTE — PROGRESS NOTE ADULT - SUBJECTIVE AND OBJECTIVE BOX
UROLOGY PA PROGRESS NOTE:     Subjective:  no acute events overnight. pt feeling well.     Objective:  Vital signs  T(F): , Max: 99.1 (09-17-17 @ 05:52)  HR: 71 (09-17-17 @ 05:52)  BP: 145/88 (09-17-17 @ 05:52)  SpO2: 96% (09-17-17 @ 05:52)  Wt(kg): --    Output     voids- 1600cc        Physical Exam:  Gen: NAD  Abd: soft, NT/ND, no cvat  : voiding    Labs:    09-16    14.92<H> / 31.8<L> /x                              10.2   14.92 )-----------( 334      ( 16 Sep 2017 08:48 )             31.8

## 2017-09-17 NOTE — PROGRESS NOTE ADULT - ASSESSMENT
41yo female s/p left ureteral stent for stone  - ucx growing staph- sensitive to bactrim  - d/c with bactrim x7days  - am CBC  - will f/u as outpatient for definitive stone management

## 2017-09-17 NOTE — PROVIDER CONTACT NOTE (OTHER) - ACTION/TREATMENT ORDERED:
Vs taken. bp 150/92, hr 71, rr 15, t 98F, Pulse ox 95 RA. EKG done, normal sinus rhythm. No further orders given. Dr. Tong notified of all findings and came to assess pt herself.

## 2017-09-19 PROBLEM — Z00.00 ENCOUNTER FOR PREVENTIVE HEALTH EXAMINATION: Noted: 2017-09-19

## 2017-09-26 ENCOUNTER — APPOINTMENT (OUTPATIENT)
Dept: UROLOGY | Facility: CLINIC | Age: 42
End: 2017-09-26
Payer: COMMERCIAL

## 2017-09-26 VITALS
HEART RATE: 89 BPM | HEIGHT: 61 IN | BODY MASS INDEX: 41.16 KG/M2 | SYSTOLIC BLOOD PRESSURE: 130 MMHG | DIASTOLIC BLOOD PRESSURE: 80 MMHG | TEMPERATURE: 98.2 F | RESPIRATION RATE: 19 BRPM | WEIGHT: 218 LBS

## 2017-09-26 DIAGNOSIS — F15.90 OTHER STIMULANT USE, UNSPECIFIED, UNCOMPLICATED: ICD-10-CM

## 2017-09-26 DIAGNOSIS — Z80.1 FAMILY HISTORY OF MALIGNANT NEOPLASM OF TRACHEA, BRONCHUS AND LUNG: ICD-10-CM

## 2017-09-26 DIAGNOSIS — Z80.49 FAMILY HISTORY OF MALIGNANT NEOPLASM OF OTHER GENITAL ORGANS: ICD-10-CM

## 2017-09-26 DIAGNOSIS — K46.9 UNSPECIFIED ABDOMINAL HERNIA W/OUT OBSTRUCTION OR GANGRENE: ICD-10-CM

## 2017-09-26 DIAGNOSIS — N20.1 CALCULUS OF URETER: ICD-10-CM

## 2017-09-26 PROCEDURE — 99214 OFFICE O/P EST MOD 30 MIN: CPT

## 2017-09-26 RX ORDER — CHLORHEXIDINE GLUCONATE 4 %
1000 LIQUID (ML) TOPICAL
Refills: 0 | Status: ACTIVE | COMMUNITY

## 2017-09-26 RX ORDER — IRON FUM,AG/C/B12/FOLIC/CA/SUC 151-60-1MG
50-101-1 TABLET ORAL DAILY
Refills: 0 | Status: ACTIVE | COMMUNITY

## 2017-09-26 RX ORDER — METFORMIN HYDROCHLORIDE 1000 MG/1
1000 TABLET, COATED ORAL
Refills: 0 | Status: ACTIVE | COMMUNITY

## 2017-09-26 RX ORDER — VALACYCLOVIR 500 MG/1
500 TABLET, FILM COATED ORAL TWICE DAILY
Refills: 0 | Status: ACTIVE | COMMUNITY

## 2017-09-27 LAB
APPEARANCE: ABNORMAL
BACTERIA: ABNORMAL
BILIRUBIN URINE: NEGATIVE
BLOOD URINE: ABNORMAL
COLOR: ABNORMAL
GLUCOSE QUALITATIVE U: 100 MG/DL
HYALINE CASTS: 0 /LPF
KETONES URINE: NEGATIVE
LEUKOCYTE ESTERASE URINE: ABNORMAL
MICROSCOPIC-UA: NORMAL
NITRITE URINE: NEGATIVE
PH URINE: 5.5
PROTEIN URINE: 100 MG/DL
RED BLOOD CELLS URINE: 250 /HPF
SPECIFIC GRAVITY URINE: 1.02
SQUAMOUS EPITHELIAL CELLS: 1 /HPF
UROBILINOGEN URINE: NORMAL MG/DL
WHITE BLOOD CELLS URINE: 21 /HPF

## 2017-09-28 LAB — BACTERIA UR CULT: NORMAL

## 2017-10-19 ENCOUNTER — OUTPATIENT (OUTPATIENT)
Dept: OUTPATIENT SERVICES | Facility: HOSPITAL | Age: 42
LOS: 1 days | End: 2017-10-19
Payer: COMMERCIAL

## 2017-10-19 VITALS
HEIGHT: 62 IN | WEIGHT: 218.92 LBS | HEART RATE: 72 BPM | DIASTOLIC BLOOD PRESSURE: 90 MMHG | RESPIRATION RATE: 16 BRPM | OXYGEN SATURATION: 98 % | TEMPERATURE: 99 F | SYSTOLIC BLOOD PRESSURE: 137 MMHG

## 2017-10-19 DIAGNOSIS — E11.9 TYPE 2 DIABETES MELLITUS WITHOUT COMPLICATIONS: ICD-10-CM

## 2017-10-19 DIAGNOSIS — Z98.89 OTHER SPECIFIED POSTPROCEDURAL STATES: Chronic | ICD-10-CM

## 2017-10-19 DIAGNOSIS — Z98.891 HISTORY OF UTERINE SCAR FROM PREVIOUS SURGERY: Chronic | ICD-10-CM

## 2017-10-19 DIAGNOSIS — N20.1 CALCULUS OF URETER: ICD-10-CM

## 2017-10-19 DIAGNOSIS — N20.0 CALCULUS OF KIDNEY: ICD-10-CM

## 2017-10-19 DIAGNOSIS — G47.33 OBSTRUCTIVE SLEEP APNEA (ADULT) (PEDIATRIC): ICD-10-CM

## 2017-10-19 DIAGNOSIS — Z01.818 ENCOUNTER FOR OTHER PREPROCEDURAL EXAMINATION: ICD-10-CM

## 2017-10-19 DIAGNOSIS — Z98.890 OTHER SPECIFIED POSTPROCEDURAL STATES: Chronic | ICD-10-CM

## 2017-10-19 LAB
ANION GAP SERPL CALC-SCNC: 14 MMOL/L — SIGNIFICANT CHANGE UP (ref 5–17)
BUN SERPL-MCNC: 9 MG/DL — SIGNIFICANT CHANGE UP (ref 7–23)
CALCIUM SERPL-MCNC: 10.2 MG/DL — SIGNIFICANT CHANGE UP (ref 8.4–10.5)
CHLORIDE SERPL-SCNC: 102 MMOL/L — SIGNIFICANT CHANGE UP (ref 96–108)
CO2 SERPL-SCNC: 22 MMOL/L — SIGNIFICANT CHANGE UP (ref 22–31)
CREAT SERPL-MCNC: 0.71 MG/DL — SIGNIFICANT CHANGE UP (ref 0.5–1.3)
GLUCOSE SERPL-MCNC: 117 MG/DL — HIGH (ref 70–99)
HCT VFR BLD CALC: 33.8 % — LOW (ref 34.5–45)
HGB BLD-MCNC: 10.7 G/DL — LOW (ref 11.5–15.5)
MCHC RBC-ENTMCNC: 25.4 PG — LOW (ref 27–34)
MCHC RBC-ENTMCNC: 31.7 GM/DL — LOW (ref 32–36)
MCV RBC AUTO: 80.3 FL — SIGNIFICANT CHANGE UP (ref 80–100)
PLATELET # BLD AUTO: 411 K/UL — HIGH (ref 150–400)
POTASSIUM SERPL-MCNC: 4.4 MMOL/L — SIGNIFICANT CHANGE UP (ref 3.5–5.3)
POTASSIUM SERPL-SCNC: 4.4 MMOL/L — SIGNIFICANT CHANGE UP (ref 3.5–5.3)
RBC # BLD: 4.21 M/UL — SIGNIFICANT CHANGE UP (ref 3.8–5.2)
RBC # FLD: 14.7 % — HIGH (ref 10.3–14.5)
SODIUM SERPL-SCNC: 138 MMOL/L — SIGNIFICANT CHANGE UP (ref 135–145)
WBC # BLD: 13.35 K/UL — HIGH (ref 3.8–10.5)
WBC # FLD AUTO: 13.35 K/UL — HIGH (ref 3.8–10.5)

## 2017-10-19 RX ORDER — CEFAZOLIN SODIUM 1 G
2000 VIAL (EA) INJECTION ONCE
Qty: 0 | Refills: 0 | Status: DISCONTINUED | OUTPATIENT
Start: 2017-10-26 | End: 2017-11-10

## 2017-10-19 NOTE — H&P PST ADULT - PROBLEM SELECTOR PLAN 1
planned for cystoscopy, left ureteroscopy, laser lithotripsy, stone extraction, stent exchange.   PST labs send  Ucx on file  preprocedure instructions discussed

## 2017-10-19 NOTE — H&P PST ADULT - HISTORY OF PRESENT ILLNESS
42 year old Obese female with PMH Dm2 ( last HgA1c 7.2 9/2017), 9/2017 hospital stay w/ UTI/Hematuria/ found to have 1 cm stone ureteropelvic junction w/ left kidney hydronephrosis planned for 42 year old Obese female with PMH Dm2 ( last HgA1c 7.2 9/2017), 9/15/2017-9/17 hospital stay w/ UTI/Hematuria/ found to have 1 cm stone ureteropelvic junction w/ left kidney hydronephrosis s/p left ureteral stent planned for cystoscopy, left ureteroscopy, laser lithotripsy, stone extraction, stent exchange.

## 2017-10-19 NOTE — H&P PST ADULT - PMH
Diabetes  type 2  Hernia    Kidney stones Genital sore  recurrent w/Menstrual period, uses PRN Valcyclovir.  Hematuria    Hernia  umbilical  Kidney stones    Thyroid nodule  s/p biopsy 2015  Type 2 diabetes mellitus without complication, unspecified long term insulin use status

## 2017-10-19 NOTE — H&P PST ADULT - NSANTHOSAYNRD_GEN_A_CORE
No. KEENA screening performed.  STOP BANG Legend: 0-2 = LOW Risk; 3-4 = INTERMEDIATE Risk; 5-8 = HIGH Risk

## 2017-10-26 ENCOUNTER — APPOINTMENT (OUTPATIENT)
Dept: UROLOGY | Facility: HOSPITAL | Age: 42
End: 2017-10-26

## 2017-10-26 ENCOUNTER — RESULT REVIEW (OUTPATIENT)
Age: 42
End: 2017-10-26

## 2017-10-26 ENCOUNTER — OUTPATIENT (OUTPATIENT)
Dept: OUTPATIENT SERVICES | Facility: HOSPITAL | Age: 42
LOS: 1 days | End: 2017-10-26
Payer: COMMERCIAL

## 2017-10-26 ENCOUNTER — TRANSCRIPTION ENCOUNTER (OUTPATIENT)
Age: 42
End: 2017-10-26

## 2017-10-26 VITALS
OXYGEN SATURATION: 100 % | HEART RATE: 73 BPM | SYSTOLIC BLOOD PRESSURE: 114 MMHG | DIASTOLIC BLOOD PRESSURE: 76 MMHG | RESPIRATION RATE: 16 BRPM | TEMPERATURE: 98 F

## 2017-10-26 VITALS
SYSTOLIC BLOOD PRESSURE: 135 MMHG | RESPIRATION RATE: 17 BRPM | OXYGEN SATURATION: 98 % | HEART RATE: 87 BPM | DIASTOLIC BLOOD PRESSURE: 78 MMHG | TEMPERATURE: 98 F

## 2017-10-26 DIAGNOSIS — Z98.89 OTHER SPECIFIED POSTPROCEDURAL STATES: Chronic | ICD-10-CM

## 2017-10-26 DIAGNOSIS — Z98.891 HISTORY OF UTERINE SCAR FROM PREVIOUS SURGERY: Chronic | ICD-10-CM

## 2017-10-26 DIAGNOSIS — N20.1 CALCULUS OF URETER: ICD-10-CM

## 2017-10-26 DIAGNOSIS — Z98.890 OTHER SPECIFIED POSTPROCEDURAL STATES: Chronic | ICD-10-CM

## 2017-10-26 DIAGNOSIS — N20.0 CALCULUS OF KIDNEY: ICD-10-CM

## 2017-10-26 LAB — HCG UR QL: NEGATIVE — SIGNIFICANT CHANGE UP

## 2017-10-26 PROCEDURE — 88300 SURGICAL PATH GROSS: CPT

## 2017-10-26 PROCEDURE — 80048 BASIC METABOLIC PNL TOTAL CA: CPT

## 2017-10-26 PROCEDURE — 52356 CYSTO/URETERO W/LITHOTRIPSY: CPT | Mod: LT

## 2017-10-26 PROCEDURE — 76000 FLUOROSCOPY <1 HR PHYS/QHP: CPT

## 2017-10-26 PROCEDURE — C2617: CPT

## 2017-10-26 PROCEDURE — 74420 UROGRAPHY RTRGR +-KUB: CPT | Mod: 26

## 2017-10-26 PROCEDURE — G0463: CPT

## 2017-10-26 PROCEDURE — 81025 URINE PREGNANCY TEST: CPT

## 2017-10-26 PROCEDURE — C1769: CPT

## 2017-10-26 PROCEDURE — 82962 GLUCOSE BLOOD TEST: CPT

## 2017-10-26 PROCEDURE — 85027 COMPLETE CBC AUTOMATED: CPT

## 2017-10-26 PROCEDURE — C1758: CPT

## 2017-10-26 PROCEDURE — 88300 SURGICAL PATH GROSS: CPT | Mod: 26

## 2017-10-26 PROCEDURE — C1889: CPT

## 2017-10-26 RX ORDER — METOCLOPRAMIDE HCL 10 MG
10 TABLET ORAL ONCE
Qty: 0 | Refills: 0 | Status: DISCONTINUED | OUTPATIENT
Start: 2017-10-26 | End: 2017-10-26

## 2017-10-26 RX ORDER — ONDANSETRON 8 MG/1
4 TABLET, FILM COATED ORAL ONCE
Qty: 0 | Refills: 0 | Status: DISCONTINUED | OUTPATIENT
Start: 2017-10-26 | End: 2017-10-26

## 2017-10-26 RX ORDER — LIDOCAINE HCL 20 MG/ML
0.2 VIAL (ML) INJECTION ONCE
Qty: 0 | Refills: 0 | Status: DISCONTINUED | OUTPATIENT
Start: 2017-10-26 | End: 2017-10-26

## 2017-10-26 RX ORDER — FENTANYL CITRATE 50 UG/ML
25 INJECTION INTRAVENOUS
Qty: 0 | Refills: 0 | Status: DISCONTINUED | OUTPATIENT
Start: 2017-10-26 | End: 2017-10-26

## 2017-10-26 RX ORDER — SODIUM CHLORIDE 9 MG/ML
3 INJECTION INTRAMUSCULAR; INTRAVENOUS; SUBCUTANEOUS EVERY 8 HOURS
Qty: 0 | Refills: 0 | Status: DISCONTINUED | OUTPATIENT
Start: 2017-10-26 | End: 2017-10-26

## 2017-10-26 RX ORDER — SODIUM CHLORIDE 9 MG/ML
1000 INJECTION, SOLUTION INTRAVENOUS
Qty: 0 | Refills: 0 | Status: DISCONTINUED | OUTPATIENT
Start: 2017-10-26 | End: 2017-11-10

## 2017-10-26 RX ADMIN — SODIUM CHLORIDE 100 MILLILITER(S): 9 INJECTION, SOLUTION INTRAVENOUS at 16:41

## 2017-10-26 NOTE — ASU DISCHARGE PLAN (ADULT/PEDIATRIC). - MEDICATION SUMMARY - MEDICATIONS TO TAKE
I will START or STAY ON the medications listed below when I get home from the hospital:    multiple vitamins  -- orally once a day  -- Indication: For home med    Percocet 5/325 oral tablet  -- 1 tab(s) by mouth every 6 hours, As Needed -for moderate pain MDD:4   -- Caution federal law prohibits the transfer of this drug to any person other  than the person for whom it was prescribed.  May cause drowsiness.  Alcohol may intensify this effect.  Use care when operating dangerous machinery.  This prescription cannot be refilled.  This product contains acetaminophen.  Do not use  with any other product containing acetaminophen to prevent possible liver damage.  Using more of this medication than prescribed may cause serious breathing problems.    -- Indication: For pain    metFORMIN 1000 mg oral tablet  -- 1 tab(s) by mouth 2 times a day  -- Indication: For home med    valACYclovir 500 mg oral tablet  -- 1 tab(s) by mouth every 12 hours w/ Menstrual period  -- Indication: For home med    Augmentin 875 mg-125 mg oral tablet  -- 1 tab(s) by mouth 2 times a day   -- Finish all this medication unless otherwise directed by prescriber.  Take with food or milk.    -- Indication: For prevention

## 2017-10-26 NOTE — ASU DISCHARGE PLAN (ADULT/PEDIATRIC). - NOTIFY
Numbness, tingling/Inability to Tolerate Liquids or Foods/Bleeding that does not stop/Numbness, color, or temperature change to extremity/Excessive Diarrhea/Unable to Urinate/Pain not relieved by Medications/Fever greater than 101/Increased Irritability or Sluggishness/Swelling that continues/Persistent Nausea and Vomiting

## 2017-10-26 NOTE — ASU DISCHARGE PLAN (ADULT/PEDIATRIC). - ITEMS TO FOLLOWUP WITH YOUR PHYSICIAN'S
Take Percocet, 1-2 tabs every 4-6 hours as needed for moderate/severe pain. Otherwise, you may take ibuprofen or tylenol. Percocet (oxycodone-acetaminophen) does contain Tylenol (acetaminophen). Please do not take >3500mg of Tylenol per 24 hours.    Drink plenty of fluids. You may notice blood in your urine for several days, which is normal.    Take 3 days of Augmentin to prevent urinary tract infection    You have a ureteral stent to help with tissue healing and drainage of the kidney. The stent is temporary, and must be eventually removed or it may cause problems with infection and kidney damage if left in place greater than 3 months.    Dr. Quispe would like to see you on 10/31. Please call the office to schedule/confirm an appointment.    [facility]  [address] [place] [phone]

## 2017-10-26 NOTE — BRIEF OPERATIVE NOTE - PROCEDURE
<<-----Click on this checkbox to enter Procedure Placement of ureteral stent  10/26/2017    Active  ANG5  Cystoscopy and ureteroscopy with laser lithotripsy  10/26/2017    Active  ANG5

## 2017-10-27 ENCOUNTER — INPATIENT (INPATIENT)
Facility: HOSPITAL | Age: 42
LOS: 6 days | Discharge: HOME | DRG: 862 | End: 2017-11-03
Attending: UROLOGY | Admitting: EMERGENCY MEDICINE
Payer: COMMERCIAL

## 2017-10-27 ENCOUNTER — MESSAGE (OUTPATIENT)
Age: 42
End: 2017-10-27

## 2017-10-27 VITALS
TEMPERATURE: 103 F | SYSTOLIC BLOOD PRESSURE: 149 MMHG | RESPIRATION RATE: 17 BRPM | HEART RATE: 109 BPM | DIASTOLIC BLOOD PRESSURE: 85 MMHG | OXYGEN SATURATION: 99 %

## 2017-10-27 DIAGNOSIS — A41.9 SEPSIS, UNSPECIFIED ORGANISM: ICD-10-CM

## 2017-10-27 DIAGNOSIS — Z98.89 OTHER SPECIFIED POSTPROCEDURAL STATES: Chronic | ICD-10-CM

## 2017-10-27 DIAGNOSIS — Z98.890 OTHER SPECIFIED POSTPROCEDURAL STATES: Chronic | ICD-10-CM

## 2017-10-27 DIAGNOSIS — Z98.891 HISTORY OF UTERINE SCAR FROM PREVIOUS SURGERY: Chronic | ICD-10-CM

## 2017-10-27 PROBLEM — N20.0 CALCULUS OF KIDNEY: Chronic | Status: ACTIVE | Noted: 2017-09-14

## 2017-10-27 PROBLEM — K46.9 UNSPECIFIED ABDOMINAL HERNIA WITHOUT OBSTRUCTION OR GANGRENE: Chronic | Status: ACTIVE | Noted: 2017-09-14

## 2017-10-27 LAB
ALBUMIN SERPL ELPH-MCNC: 3.9 G/DL — SIGNIFICANT CHANGE UP (ref 3.3–5)
ALP SERPL-CCNC: 65 U/L — SIGNIFICANT CHANGE UP (ref 40–120)
ALT FLD-CCNC: 29 U/L RC — SIGNIFICANT CHANGE UP (ref 10–45)
ANION GAP SERPL CALC-SCNC: 15 MMOL/L — SIGNIFICANT CHANGE UP (ref 5–17)
APPEARANCE UR: CLEAR — SIGNIFICANT CHANGE UP
APTT BLD: 25.9 SEC — LOW (ref 27.5–37.4)
AST SERPL-CCNC: 18 U/L — SIGNIFICANT CHANGE UP (ref 10–40)
BASOPHILS # BLD AUTO: 0 K/UL — SIGNIFICANT CHANGE UP (ref 0–0.2)
BASOPHILS NFR BLD AUTO: 0.2 % — SIGNIFICANT CHANGE UP (ref 0–2)
BILIRUB SERPL-MCNC: 0.4 MG/DL — SIGNIFICANT CHANGE UP (ref 0.2–1.2)
BILIRUB UR-MCNC: NEGATIVE — SIGNIFICANT CHANGE UP
BUN SERPL-MCNC: 6 MG/DL — LOW (ref 7–23)
CALCIUM SERPL-MCNC: 9.7 MG/DL — SIGNIFICANT CHANGE UP (ref 8.4–10.5)
CHLORIDE SERPL-SCNC: 95 MMOL/L — LOW (ref 96–108)
CO2 SERPL-SCNC: 24 MMOL/L — SIGNIFICANT CHANGE UP (ref 22–31)
COLOR SPEC: SIGNIFICANT CHANGE UP
CREAT SERPL-MCNC: 0.75 MG/DL — SIGNIFICANT CHANGE UP (ref 0.5–1.3)
DIFF PNL FLD: ABNORMAL
EOSINOPHIL # BLD AUTO: 0.3 K/UL — SIGNIFICANT CHANGE UP (ref 0–0.5)
EOSINOPHIL NFR BLD AUTO: 1.9 % — SIGNIFICANT CHANGE UP (ref 0–6)
GAS PNL BLDV: SIGNIFICANT CHANGE UP
GLUCOSE SERPL-MCNC: 129 MG/DL — HIGH (ref 70–99)
GLUCOSE UR QL: NEGATIVE — SIGNIFICANT CHANGE UP
HCT VFR BLD CALC: 34.2 % — LOW (ref 34.5–45)
HGB BLD-MCNC: 11.6 G/DL — SIGNIFICANT CHANGE UP (ref 11.5–15.5)
INR BLD: 1.17 RATIO — HIGH (ref 0.88–1.16)
KETONES UR-MCNC: NEGATIVE — SIGNIFICANT CHANGE UP
LEUKOCYTE ESTERASE UR-ACNC: ABNORMAL
LYMPHOCYTES # BLD AUTO: 1.5 K/UL — SIGNIFICANT CHANGE UP (ref 1–3.3)
LYMPHOCYTES # BLD AUTO: 11.2 % — LOW (ref 13–44)
MCHC RBC-ENTMCNC: 27.4 PG — SIGNIFICANT CHANGE UP (ref 27–34)
MCHC RBC-ENTMCNC: 33.8 GM/DL — SIGNIFICANT CHANGE UP (ref 32–36)
MCV RBC AUTO: 81 FL — SIGNIFICANT CHANGE UP (ref 80–100)
MONOCYTES # BLD AUTO: 0.9 K/UL — SIGNIFICANT CHANGE UP (ref 0–0.9)
MONOCYTES NFR BLD AUTO: 6.8 % — SIGNIFICANT CHANGE UP (ref 2–14)
NEUTROPHILS # BLD AUTO: 11 K/UL — HIGH (ref 1.8–7.4)
NEUTROPHILS NFR BLD AUTO: 79.9 % — HIGH (ref 43–77)
NITRITE UR-MCNC: NEGATIVE — SIGNIFICANT CHANGE UP
PH UR: 6 — SIGNIFICANT CHANGE UP (ref 5–8)
PLATELET # BLD AUTO: 363 K/UL — SIGNIFICANT CHANGE UP (ref 150–400)
POTASSIUM SERPL-MCNC: 3.7 MMOL/L — SIGNIFICANT CHANGE UP (ref 3.5–5.3)
POTASSIUM SERPL-SCNC: 3.7 MMOL/L — SIGNIFICANT CHANGE UP (ref 3.5–5.3)
PROT SERPL-MCNC: 7.9 G/DL — SIGNIFICANT CHANGE UP (ref 6–8.3)
PROT UR-MCNC: SIGNIFICANT CHANGE UP
PROTHROM AB SERPL-ACNC: 12.7 SEC — SIGNIFICANT CHANGE UP (ref 9.8–12.7)
RBC # BLD: 4.22 M/UL — SIGNIFICANT CHANGE UP (ref 3.8–5.2)
RBC # FLD: 13.1 % — SIGNIFICANT CHANGE UP (ref 10.3–14.5)
SODIUM SERPL-SCNC: 134 MMOL/L — LOW (ref 135–145)
SP GR SPEC: 1.01 — LOW (ref 1.01–1.02)
UROBILINOGEN FLD QL: NEGATIVE — SIGNIFICANT CHANGE UP
WBC # BLD: 13.7 K/UL — HIGH (ref 3.8–10.5)
WBC # FLD AUTO: 13.7 K/UL — HIGH (ref 3.8–10.5)

## 2017-10-27 PROCEDURE — 99222 1ST HOSP IP/OBS MODERATE 55: CPT

## 2017-10-27 PROCEDURE — 99285 EMERGENCY DEPT VISIT HI MDM: CPT

## 2017-10-27 RX ORDER — INFLUENZA VIRUS VACCINE 15; 15; 15; 15 UG/.5ML; UG/.5ML; UG/.5ML; UG/.5ML
0.5 SUSPENSION INTRAMUSCULAR ONCE
Qty: 0 | Refills: 0 | Status: DISCONTINUED | OUTPATIENT
Start: 2017-10-27 | End: 2017-11-03

## 2017-10-27 RX ORDER — PHENAZOPYRIDINE HCL 100 MG
100 TABLET ORAL EVERY 8 HOURS
Qty: 0 | Refills: 0 | Status: COMPLETED | OUTPATIENT
Start: 2017-10-27 | End: 2017-10-30

## 2017-10-27 RX ORDER — SODIUM CHLORIDE 9 MG/ML
3 INJECTION INTRAMUSCULAR; INTRAVENOUS; SUBCUTANEOUS ONCE
Qty: 0 | Refills: 0 | Status: COMPLETED | OUTPATIENT
Start: 2017-10-27 | End: 2017-10-27

## 2017-10-27 RX ORDER — METFORMIN HYDROCHLORIDE 850 MG/1
1 TABLET ORAL
Qty: 0 | Refills: 0 | COMMUNITY

## 2017-10-27 RX ORDER — INSULIN LISPRO 100/ML
VIAL (ML) SUBCUTANEOUS AT BEDTIME
Qty: 0 | Refills: 0 | Status: DISCONTINUED | OUTPATIENT
Start: 2017-10-27 | End: 2017-11-03

## 2017-10-27 RX ORDER — METFORMIN HYDROCHLORIDE 850 MG/1
2 TABLET ORAL
Qty: 0 | Refills: 0 | COMMUNITY

## 2017-10-27 RX ORDER — DOCUSATE SODIUM 100 MG
100 CAPSULE ORAL THREE TIMES A DAY
Qty: 0 | Refills: 0 | Status: DISCONTINUED | OUTPATIENT
Start: 2017-10-27 | End: 2017-11-03

## 2017-10-27 RX ORDER — SODIUM CHLORIDE 9 MG/ML
500 INJECTION INTRAMUSCULAR; INTRAVENOUS; SUBCUTANEOUS
Qty: 0 | Refills: 0 | Status: DISCONTINUED | OUTPATIENT
Start: 2017-10-27 | End: 2017-10-31

## 2017-10-27 RX ORDER — CEFTRIAXONE 500 MG/1
1 INJECTION, POWDER, FOR SOLUTION INTRAMUSCULAR; INTRAVENOUS ONCE
Qty: 0 | Refills: 0 | Status: COMPLETED | OUTPATIENT
Start: 2017-10-27 | End: 2017-10-27

## 2017-10-27 RX ORDER — ACETAMINOPHEN 500 MG
1000 TABLET ORAL ONCE
Qty: 0 | Refills: 0 | Status: COMPLETED | OUTPATIENT
Start: 2017-10-27 | End: 2017-10-27

## 2017-10-27 RX ORDER — DEXTROSE 50 % IN WATER 50 %
25 SYRINGE (ML) INTRAVENOUS ONCE
Qty: 0 | Refills: 0 | Status: DISCONTINUED | OUTPATIENT
Start: 2017-10-27 | End: 2017-11-03

## 2017-10-27 RX ORDER — INSULIN LISPRO 100/ML
VIAL (ML) SUBCUTANEOUS
Qty: 0 | Refills: 0 | Status: DISCONTINUED | OUTPATIENT
Start: 2017-10-27 | End: 2017-11-03

## 2017-10-27 RX ORDER — DEXTROSE 50 % IN WATER 50 %
12.5 SYRINGE (ML) INTRAVENOUS ONCE
Qty: 0 | Refills: 0 | Status: DISCONTINUED | OUTPATIENT
Start: 2017-10-27 | End: 2017-11-03

## 2017-10-27 RX ORDER — SENNA PLUS 8.6 MG/1
2 TABLET ORAL AT BEDTIME
Qty: 0 | Refills: 0 | Status: DISCONTINUED | OUTPATIENT
Start: 2017-10-27 | End: 2017-11-03

## 2017-10-27 RX ORDER — DEXTROSE 50 % IN WATER 50 %
1 SYRINGE (ML) INTRAVENOUS ONCE
Qty: 0 | Refills: 0 | Status: DISCONTINUED | OUTPATIENT
Start: 2017-10-27 | End: 2017-11-03

## 2017-10-27 RX ORDER — ACETAMINOPHEN 500 MG
650 TABLET ORAL EVERY 6 HOURS
Qty: 0 | Refills: 0 | Status: DISCONTINUED | OUTPATIENT
Start: 2017-10-27 | End: 2017-11-03

## 2017-10-27 RX ORDER — SODIUM CHLORIDE 9 MG/ML
1000 INJECTION, SOLUTION INTRAVENOUS
Qty: 0 | Refills: 0 | Status: DISCONTINUED | OUTPATIENT
Start: 2017-10-27 | End: 2017-11-03

## 2017-10-27 RX ORDER — SODIUM CHLORIDE 9 MG/ML
1000 INJECTION INTRAMUSCULAR; INTRAVENOUS; SUBCUTANEOUS
Qty: 0 | Refills: 0 | Status: DISCONTINUED | OUTPATIENT
Start: 2017-10-27 | End: 2017-10-31

## 2017-10-27 RX ORDER — OXYCODONE AND ACETAMINOPHEN 5; 325 MG/1; MG/1
1 TABLET ORAL EVERY 4 HOURS
Qty: 0 | Refills: 0 | Status: DISCONTINUED | OUTPATIENT
Start: 2017-10-27 | End: 2017-11-03

## 2017-10-27 RX ORDER — HEPARIN SODIUM 5000 [USP'U]/ML
5000 INJECTION INTRAVENOUS; SUBCUTANEOUS EVERY 8 HOURS
Qty: 0 | Refills: 0 | Status: DISCONTINUED | OUTPATIENT
Start: 2017-10-27 | End: 2017-11-03

## 2017-10-27 RX ORDER — GLUCAGON INJECTION, SOLUTION 0.5 MG/.1ML
1 INJECTION, SOLUTION SUBCUTANEOUS ONCE
Qty: 0 | Refills: 0 | Status: DISCONTINUED | OUTPATIENT
Start: 2017-10-27 | End: 2017-11-03

## 2017-10-27 RX ORDER — TAMSULOSIN HYDROCHLORIDE 0.4 MG/1
0.4 CAPSULE ORAL AT BEDTIME
Qty: 0 | Refills: 0 | Status: DISCONTINUED | OUTPATIENT
Start: 2017-10-27 | End: 2017-11-03

## 2017-10-27 RX ADMIN — HEPARIN SODIUM 5000 UNIT(S): 5000 INJECTION INTRAVENOUS; SUBCUTANEOUS at 21:48

## 2017-10-27 RX ADMIN — CEFTRIAXONE 100 GRAM(S): 500 INJECTION, POWDER, FOR SOLUTION INTRAMUSCULAR; INTRAVENOUS at 18:57

## 2017-10-27 RX ADMIN — Medication 100 MILLIGRAM(S): at 21:48

## 2017-10-27 RX ADMIN — Medication 650 MILLIGRAM(S): at 22:03

## 2017-10-27 RX ADMIN — SODIUM CHLORIDE 2000 MILLILITER(S): 9 INJECTION INTRAMUSCULAR; INTRAVENOUS; SUBCUTANEOUS at 19:29

## 2017-10-27 RX ADMIN — TAMSULOSIN HYDROCHLORIDE 0.4 MILLIGRAM(S): 0.4 CAPSULE ORAL at 21:48

## 2017-10-27 RX ADMIN — SODIUM CHLORIDE 2000 MILLILITER(S): 9 INJECTION INTRAMUSCULAR; INTRAVENOUS; SUBCUTANEOUS at 18:00

## 2017-10-27 RX ADMIN — SODIUM CHLORIDE 2000 MILLILITER(S): 9 INJECTION INTRAMUSCULAR; INTRAVENOUS; SUBCUTANEOUS at 17:50

## 2017-10-27 RX ADMIN — SODIUM CHLORIDE 3 MILLILITER(S): 9 INJECTION INTRAMUSCULAR; INTRAVENOUS; SUBCUTANEOUS at 17:44

## 2017-10-27 RX ADMIN — SODIUM CHLORIDE 2000 MILLILITER(S): 9 INJECTION INTRAMUSCULAR; INTRAVENOUS; SUBCUTANEOUS at 17:55

## 2017-10-27 RX ADMIN — SENNA PLUS 2 TABLET(S): 8.6 TABLET ORAL at 21:48

## 2017-10-27 RX ADMIN — SODIUM CHLORIDE 2000 MILLILITER(S): 9 INJECTION INTRAMUSCULAR; INTRAVENOUS; SUBCUTANEOUS at 19:15

## 2017-10-27 RX ADMIN — Medication 400 MILLIGRAM(S): at 17:21

## 2017-10-27 RX ADMIN — SODIUM CHLORIDE 125 MILLILITER(S): 9 INJECTION INTRAMUSCULAR; INTRAVENOUS; SUBCUTANEOUS at 18:57

## 2017-10-27 NOTE — H&P ADULT - NSHPPHYSICALEXAM_GEN_ALL_CORE
awake alert nad axox3 nontoxic appearing   obese soft ntnd   no cvat bl  non palp bladder + bladder ttp

## 2017-10-27 NOTE — ED PROVIDER NOTE - PHYSICAL EXAMINATION
Gen: Ill appearing  Eyes:  sclerae white, no icterus  ENT: Moist mucous membranes. No exudates  Neck: supple, no LAD, mass or goiter, trachea midline  CV: Tachy. Audible S1 and S2. No murmurs, rubs, gallops, S3, nor S4  Pulm: Clear to auscultation bilaterally. No wheezes, rales, or rhonchi  Abd: BS+, nondistended, No tenderness to palpation  Musculoskeletal:  No edema  Skin: no lesions or scars noted  Psych: mood good, affect full range and congruent with mood.  Neurologic: AAOx3

## 2017-10-27 NOTE — H&P ADULT - FAMILY HISTORY
Sibling  Still living? Unknown  Family history of diabetes mellitus (DM), Age at diagnosis: Age Unknown

## 2017-10-27 NOTE — ED PROVIDER NOTE - OBJECTIVE STATEMENT
43 y/o F w/ hx of DM, obesity, nephrolith, yesterday had Cystoscopy and ureteroscopy with laser lithotripsy  and stent placement by Dr. Quispe yesterday, p/w fever since last night, also shaking chills and dysuria.

## 2017-10-27 NOTE — ED ADULT NURSE NOTE - PSH
H/O abdominoplasty  2015  H/O:   x3  History of   x 3 (, , )  History of hernia repair  x 3  History of incisional hernia repair  x 3

## 2017-10-27 NOTE — H&P ADULT - ASSESSMENT
41yo female with pmh of dm2, obesity, and nephrolithiasis who underwent left urs, laser and stent yesterday (10/26). She arrives with urosepsis post op     - pt to void and obtain ucx and ua  - bcx sent and rec.  - ctx to be given by er once urine obtained  - iv apap given for fever   - trend fever curve   - pain control   - iss for dm2   - pain control with percs, pyridium and flomax   - dw dr arce for admission

## 2017-10-27 NOTE — ED PROVIDER NOTE - CARE PLAN
Principal Discharge DX:	Sepsis Principal Discharge DX:	Sepsis  Goal:	post procedural uti / pyelonephritis w sepsis

## 2017-10-27 NOTE — H&P ADULT - NSHPLABSRESULTS_GEN_ALL_CORE
11.6   13.7  )-----------( 363      ( 27 Oct 2017 17:37 )             34.2       10-27    134<L>  |  95<L>  |  6<L>  ----------------------------<  129<H>  3.7   |  24  |  0.75    Ca    9.7      27 Oct 2017 17:37    TPro  7.9  /  Alb  3.9  /  TBili  0.4  /  DBili  x   /  AST  18  /  ALT  29  /  AlkPhos  65  10-27      U/A pending at this time   UCX not yet sent as pt has not voided   BCX x 2 sent and rec.

## 2017-10-27 NOTE — ED ADULT NURSE NOTE - PMH
Chronic anemia    DM (diabetes mellitus)    Genital sore  recurrent w/Menstrual period, uses PRN Valcyclovir.  Hematuria    Hernia  umbilical  Incisional hernia    Kidney stones    Morbid obesity due to excess calories    Thyroid nodule  s/p biopsy 2015  Type 2 diabetes mellitus without complication, unspecified long term insulin use status    Vitamin B12 deficiency

## 2017-10-27 NOTE — H&P ADULT - HISTORY OF PRESENT ILLNESS
42yFemale s/p left ureteroscopy and laser of stone with ureteral stent placement yesterday at [facility] This 3am dev fever 102 and chills, dysuria, and left flank pain. Postop hematuria is resolved. She arrives to the ER for persistent fever, malaise and treatment of urosepsis. She got standard preop abx and ppx augmentin which she took one dose today. In Sept had Strep UTI pan sensitive     No nausea/ vomiting/ sensation of retention/ hematuria    To get Ceftriaxone here in ER.     PAST MEDICAL & SURGICAL HISTORY:  Thyroid nodule: s/p biopsy   Hematuria  Genital sore: recurrent w/Menstrual period, uses PRN Valcyclovir.  Type 2 diabetes mellitus without complication, unspecified long term insulin use status  Hernia: umbilical  Kidney stones  Morbid obesity due to excess calories  Chronic anemia  Incisional hernia  Vitamin B12 deficiency  DM (diabetes mellitus)  History of hernia repair: x 3  H/O: : x3  H/O abdominoplasty: 2015  History of incisional hernia repair: x 3  History of : x 3 (, , )      MEDICATIONS  (STANDING):  cefTRIAXone   IVPB 1 Gram(s) IV Intermittent Once  sodium chloride 0.9% Bolus 500 milliLiter(s) IV Bolus every 15 minutes    MEDICATIONS  (PRN):      FAMILY HISTORY:  Family history of diabetes mellitus (DM) (Sibling)      Allergies    many vegetables and fruits (Urticaria)  No Known Drug Allergies

## 2017-10-28 LAB
ANION GAP SERPL CALC-SCNC: 15 MMOL/L — SIGNIFICANT CHANGE UP (ref 5–17)
BUN SERPL-MCNC: 7 MG/DL — SIGNIFICANT CHANGE UP (ref 7–23)
CALCIUM SERPL-MCNC: 8.6 MG/DL — SIGNIFICANT CHANGE UP (ref 8.4–10.5)
CHLORIDE SERPL-SCNC: 101 MMOL/L — SIGNIFICANT CHANGE UP (ref 96–108)
CO2 SERPL-SCNC: 22 MMOL/L — SIGNIFICANT CHANGE UP (ref 22–31)
CREAT SERPL-MCNC: 0.7 MG/DL — SIGNIFICANT CHANGE UP (ref 0.5–1.3)
CULTURE RESULTS: NO GROWTH — SIGNIFICANT CHANGE UP
GLUCOSE SERPL-MCNC: 152 MG/DL — HIGH (ref 70–99)
HCT VFR BLD CALC: 30.4 % — LOW (ref 34.5–45)
HGB BLD-MCNC: 10.2 G/DL — LOW (ref 11.5–15.5)
MCHC RBC-ENTMCNC: 27.3 PG — SIGNIFICANT CHANGE UP (ref 27–34)
MCHC RBC-ENTMCNC: 33.5 GM/DL — SIGNIFICANT CHANGE UP (ref 32–36)
MCV RBC AUTO: 81.5 FL — SIGNIFICANT CHANGE UP (ref 80–100)
PLATELET # BLD AUTO: 296 K/UL — SIGNIFICANT CHANGE UP (ref 150–400)
POTASSIUM SERPL-MCNC: 3.3 MMOL/L — LOW (ref 3.5–5.3)
POTASSIUM SERPL-SCNC: 3.3 MMOL/L — LOW (ref 3.5–5.3)
RBC # BLD: 3.73 M/UL — LOW (ref 3.8–5.2)
RBC # FLD: 13.1 % — SIGNIFICANT CHANGE UP (ref 10.3–14.5)
SODIUM SERPL-SCNC: 138 MMOL/L — SIGNIFICANT CHANGE UP (ref 135–145)
SPECIMEN SOURCE: SIGNIFICANT CHANGE UP
WBC # BLD: 12.9 K/UL — HIGH (ref 3.8–10.5)
WBC # FLD AUTO: 12.9 K/UL — HIGH (ref 3.8–10.5)

## 2017-10-28 PROCEDURE — 99222 1ST HOSP IP/OBS MODERATE 55: CPT | Mod: GC

## 2017-10-28 PROCEDURE — 99232 SBSQ HOSP IP/OBS MODERATE 35: CPT

## 2017-10-28 PROCEDURE — 74000: CPT | Mod: 26

## 2017-10-28 RX ORDER — ERTAPENEM SODIUM 1 G/1
INJECTION, POWDER, LYOPHILIZED, FOR SOLUTION INTRAMUSCULAR; INTRAVENOUS
Qty: 0 | Refills: 0 | Status: DISCONTINUED | OUTPATIENT
Start: 2017-10-28 | End: 2017-10-28

## 2017-10-28 RX ORDER — POTASSIUM CHLORIDE 20 MEQ
40 PACKET (EA) ORAL ONCE
Qty: 0 | Refills: 0 | Status: COMPLETED | OUTPATIENT
Start: 2017-10-28 | End: 2017-10-28

## 2017-10-28 RX ORDER — ACETAMINOPHEN 500 MG
1000 TABLET ORAL ONCE
Qty: 0 | Refills: 0 | Status: COMPLETED | OUTPATIENT
Start: 2017-10-28 | End: 2017-10-28

## 2017-10-28 RX ORDER — IBUPROFEN 200 MG
800 TABLET ORAL EVERY 6 HOURS
Qty: 0 | Refills: 0 | Status: DISCONTINUED | OUTPATIENT
Start: 2017-10-28 | End: 2017-11-03

## 2017-10-28 RX ORDER — ONDANSETRON 8 MG/1
4 TABLET, FILM COATED ORAL EVERY 8 HOURS
Qty: 0 | Refills: 0 | Status: DISCONTINUED | OUTPATIENT
Start: 2017-10-28 | End: 2017-11-03

## 2017-10-28 RX ORDER — PIPERACILLIN AND TAZOBACTAM 4; .5 G/20ML; G/20ML
3.38 INJECTION, POWDER, LYOPHILIZED, FOR SOLUTION INTRAVENOUS EVERY 8 HOURS
Qty: 0 | Refills: 0 | Status: DISCONTINUED | OUTPATIENT
Start: 2017-10-28 | End: 2017-10-30

## 2017-10-28 RX ORDER — ONDANSETRON 8 MG/1
4 TABLET, FILM COATED ORAL EVERY 8 HOURS
Qty: 0 | Refills: 0 | Status: DISCONTINUED | OUTPATIENT
Start: 2017-10-28 | End: 2017-10-28

## 2017-10-28 RX ORDER — IBUPROFEN 200 MG
600 TABLET ORAL ONCE
Qty: 0 | Refills: 0 | Status: COMPLETED | OUTPATIENT
Start: 2017-10-28 | End: 2017-10-28

## 2017-10-28 RX ORDER — IBUPROFEN 200 MG
600 TABLET ORAL EVERY 6 HOURS
Qty: 0 | Refills: 0 | Status: COMPLETED | OUTPATIENT
Start: 2017-10-28 | End: 2017-10-28

## 2017-10-28 RX ORDER — ONDANSETRON 8 MG/1
4 TABLET, FILM COATED ORAL EVERY 6 HOURS
Qty: 0 | Refills: 0 | Status: DISCONTINUED | OUTPATIENT
Start: 2017-10-28 | End: 2017-10-28

## 2017-10-28 RX ORDER — ERTAPENEM SODIUM 1 G/1
1000 INJECTION, POWDER, LYOPHILIZED, FOR SOLUTION INTRAMUSCULAR; INTRAVENOUS ONCE
Qty: 0 | Refills: 0 | Status: COMPLETED | OUTPATIENT
Start: 2017-10-28 | End: 2017-10-28

## 2017-10-28 RX ORDER — VANCOMYCIN HCL 1 G
1500 VIAL (EA) INTRAVENOUS EVERY 12 HOURS
Qty: 0 | Refills: 0 | Status: DISCONTINUED | OUTPATIENT
Start: 2017-10-28 | End: 2017-10-30

## 2017-10-28 RX ADMIN — Medication 1: at 12:05

## 2017-10-28 RX ADMIN — SODIUM CHLORIDE 125 MILLILITER(S): 9 INJECTION INTRAMUSCULAR; INTRAVENOUS; SUBCUTANEOUS at 05:39

## 2017-10-28 RX ADMIN — Medication 300 MILLIGRAM(S): at 22:05

## 2017-10-28 RX ADMIN — Medication 100 MILLIGRAM(S): at 05:39

## 2017-10-28 RX ADMIN — Medication 600 MILLIGRAM(S): at 10:51

## 2017-10-28 RX ADMIN — HEPARIN SODIUM 5000 UNIT(S): 5000 INJECTION INTRAVENOUS; SUBCUTANEOUS at 21:43

## 2017-10-28 RX ADMIN — Medication 650 MILLIGRAM(S): at 07:01

## 2017-10-28 RX ADMIN — Medication 800 MILLIGRAM(S): at 18:55

## 2017-10-28 RX ADMIN — ERTAPENEM SODIUM 120 MILLIGRAM(S): 1 INJECTION, POWDER, LYOPHILIZED, FOR SOLUTION INTRAMUSCULAR; INTRAVENOUS at 12:05

## 2017-10-28 RX ADMIN — Medication 100 MILLIGRAM(S): at 13:16

## 2017-10-28 RX ADMIN — Medication 400 MILLIGRAM(S): at 13:16

## 2017-10-28 RX ADMIN — HEPARIN SODIUM 5000 UNIT(S): 5000 INJECTION INTRAVENOUS; SUBCUTANEOUS at 05:39

## 2017-10-28 RX ADMIN — HEPARIN SODIUM 5000 UNIT(S): 5000 INJECTION INTRAVENOUS; SUBCUTANEOUS at 13:16

## 2017-10-28 RX ADMIN — Medication 100 MILLIGRAM(S): at 21:43

## 2017-10-28 RX ADMIN — Medication 40 MILLIEQUIVALENT(S): at 12:05

## 2017-10-28 RX ADMIN — ONDANSETRON 4 MILLIGRAM(S): 8 TABLET, FILM COATED ORAL at 20:03

## 2017-10-28 RX ADMIN — Medication 600 MILLIGRAM(S): at 02:17

## 2017-10-28 RX ADMIN — Medication 600 MILLIGRAM(S): at 11:20

## 2017-10-28 RX ADMIN — PIPERACILLIN AND TAZOBACTAM 25 GRAM(S): 4; .5 INJECTION, POWDER, LYOPHILIZED, FOR SOLUTION INTRAVENOUS at 21:42

## 2017-10-28 RX ADMIN — TAMSULOSIN HYDROCHLORIDE 0.4 MILLIGRAM(S): 0.4 CAPSULE ORAL at 21:43

## 2017-10-28 NOTE — CONSULT NOTE ADULT - SUBJECTIVE AND OBJECTIVE BOX
HPI:  42yFemale s/p left ureteroscopy and lithotripsy of stone with ureteral stent replacement 10/26 at [facility]. At 3am developed fever 102 and chills, dysuria, and left flank pain. Postop hematuria is resolved. She arrives to the ER for persistent fever, malaise and treatment of urosepsis. She got standard preop abx and ppx augmentin which she took one dose PTA In Sept had left ureteral stent placed for stone was found to have Staph aureus UTI and got 1 week of bactrim    Currently on ertapenem     PAST MEDICAL & SURGICAL HISTORY:  Thyroid nodule: s/p biopsy   Hematuria  Genital sore: recurrent w/Menstrual period, uses PRN Valcyclovir.  Type 2 diabetes mellitus without complication, unspecified long term insulin use status  Hernia: umbilical  Kidney stones  Morbid obesity due to excess calories  Chronic anemia  Incisional hernia  Vitamin B12 deficiency  DM (diabetes mellitus)  History of hernia repair: x 3  H/O: : x3  H/O abdominoplasty: 2015  History of incisional hernia repair: x 3  History of : x 3 (, , )      MEDICATIONS  (STANDING):  cefTRIAXone   IVPB 1 Gram(s) IV Intermittent Once  sodium chloride 0.9% Bolus 500 milliLiter(s) IV Bolus every 15 minutes    MEDICATIONS  (PRN):      FAMILY HISTORY:  Family history of diabetes mellitus (DM) (Sibling)      Allergies    many vegetables and fruits (Urticaria)  No Known Drug Allergies (27 Oct 2017 18:15)      PAST MEDICAL & SURGICAL HISTORY:  Thyroid nodule: s/p biopsy   Hematuria  Genital sore: recurrent w/Menstrual period, uses PRN Valcyclovir.  Type 2 diabetes mellitus without complication, unspecified long term insulin use status  Hernia: umbilical  Kidney stones  Morbid obesity due to excess calories  Chronic anemia  Incisional hernia  Vitamin B12 deficiency  DM (diabetes mellitus)  History of hernia repair: x 3  H/O: : x3  H/O abdominoplasty: 2015  History of incisional hernia repair: x 3  History of : x 3 (, , )      Allergies  mc(bitter melon)  okra  papaya (Urticaria)  many vegitables, and fruites (Urticaria)  No Known Drug Allergies        ANTIMICROBIALS:  ertapenem  IVPB        OTHER MEDS: MEDICATIONS  (STANDING):  acetaminophen   Tablet 650 every 6 hours PRN  dextrose 50% Injectable 12.5 once  dextrose 50% Injectable 25 once  dextrose 50% Injectable 25 once  dextrose Gel 1 once PRN  docusate sodium 100 three times a day  glucagon  Injectable 1 once PRN  heparin  Injectable 5000 every 8 hours  influenza   Vaccine 0.5 once  insulin lispro (HumaLOG) corrective regimen sliding scale  three times a day before meals  insulin lispro (HumaLOG) corrective regimen sliding scale  at bedtime  oxyCODONE    5 mG/acetaminophen 325 mG 1 every 4 hours PRN  phenazopyridine 100 every 8 hours  senna 2 at bedtime PRN  tamsulosin 0.4 at bedtime      SOCIAL HISTORY:  [ ] etoh [ ] tobacco [ ] former smoker [ ] IVDU    FAMILY HISTORY:  Family history of diabetes mellitus (DM) (Sibling)      REVIEW OF SYSTEMS  [  ] ROS unobtainable because:    [ x ] All other systems negative except as noted below:	    Constitutional:  [ x] fever [ ] weight loss  Skin:  [ ] rash [ ] phlebitis	  Eyes: [ ] icterus [ ] inflammation	  ENMT: [ ] discharge [ ] thrush [ ] ulcers [ ] exudates  Respiratory: [ ] dyspnea [ ] hemoptysis [ ] cough [ ] sputum	  Cardiovascular:  [ ] chest pain [ ] palpitations [ ] edema	  Gastrointestinal:  [ ] nausea [ ] vomiting [ ] diarrhea [ ] constipation [ ] pain	  Genitourinary:  [x ] dysuria [ ] frequency [ ] hematuria [ ] discharge [ x] flank pain  Musculoskeletal:  [ ] myalgias [ ] arthralgias [ ] arthritis	  Neurological:  [ ] headache [ ] seizures	  Psychiatric:  [ ] anxiety [ ] depression	  Hematology/Lymphatics:  [ ] lymphadenopathy  Endocrine:  [ ] adrenal [ ] thyroid  Allergic/Immunologic:	 [ ] transplant [ ] seasonal    Vital Signs Last 24 Hrs  T(F): 98.6 (10-28-17 @ 15:02), Max: 103.1 (10-28-17 @ 12:19)    Vital Signs Last 24 Hrs  HR: 87 (10-28-17 @ 15:02) (61 - 98)  BP: 127/68 (10-28-17 @ 15:02) (101/72 - 136/67)  RR: 16 (10-28-17 @ 15:02)  SpO2: 95% (10-28-17 @ 15:02) (93% - 98%)  Wt(kg): --    PHYSICAL EXAM:  General: non-toxic  HEAD/EYES: anicteric, PERRL  ENT:  supple  Cardiovascular:   S1, S2  Respiratory:  clear bilaterally  GI:  soft, non-tender, normal bowel sounds  :  no CVA tenderness   Musculoskeletal:  no synovitis  Neurologic:  grossly non-focal  Skin:  no rash  Lymph: no lymphadenopathy  Psychiatric:  appropriate affect  Vascular:  no phlebitis                                10.2   12.9  )-----------( 296      ( 28 Oct 2017 06:35 )             30.4       10-    138  |  101  |  7   ----------------------------<  152<H>  3.3<L>   |  22  |  0.70    Ca    8.6      28 Oct 2017 06:35    TPro  7.9  /  Alb  3.9  /  TBili  0.4  /  DBili  x   /  AST  18  /  ALT  29  /  AlkPhos  65  10      Urinalysis Basic - ( 27 Oct 2017 19:41 )    Color: PL Yellow / Appearance: Clear / S.009 / pH: x  Gluc: x / Ketone: Negative  / Bili: Negative / Urobili: Negative   Blood: x / Protein: Trace / Nitrite: Negative   Leuk Esterase: Moderate / RBC: 10-25 /HPF / WBC 25-50 /HPF   Sq Epi: x / Non Sq Epi: Occasional /HPF / Bacteria: x        MICROBIOLOGY:    Urine Microscopic-Add On (NC) (10.27.17 @ 19:41)    Epithelial Cells: Occasional /HPF    Red Blood Cell - Urine: 10-25 /HPF    White Blood Cell - Urine: 25-50 /HPF    Culture - Urine (17 @ 01:34)    -  Ampicillin/Sulbactam: S <=8/4    -  Cefazolin: S <=4    -  Trimethoprim/Sulfamethoxazole: S <=0.5/9.5    -  Vancomycin: S 2    -  Ciprofloxacin: S <=1    -  Gentamicin: S <=1    -  Levofloxacin: S <=0.5    -  Nitrofurantoin: S <=32    -  Oxacillin: S 0.5    -  Penicillin: R >8    -  RIF- Rifampin: S <=1    -  Tetra/Doxy: S <=1    Specimen Source: .Urine Bladder (from O.R.)    Culture Results:   6,000 CFU/ml Staphylococcus aureus    Organism Identification: Staphylococcus aureus    Organism: Staphylococcus aureus    Method Type: ERIK    Culture - Urine (09.15.17 @ 00:35)    -  Ampicillin/Sulbactam: S <=8/4    -  Ciprofloxacin: S <=1    -  Cefazolin: S <=4    -  Vancomycin: S 2    -  Trimethoprim/Sulfamethoxazole: S <=0.5/9.5    -  Tetra/Doxy: S <=1    -  RIF- Rifampin: S <=1    -  Penicillin: R >8    -  Oxacillin: S 0.5    -  Nitrofurantoin: S <=32    -  Levofloxacin: S <=0.5    -  Gentamicin: S <=1    Specimen Source: .Urine Clean Catch (Midstream)    Culture Results:   >100,000 CFU/ml Staphylococcus aureus    Organism Identification: Staphylococcus aureus    Organism: Staphylococcus aureus    Method Type: ERIK          v            RADIOLOGY: HPI:  42yFemale s/p left ureteroscopy and lithotripsy of stone with ureteral stent replacement 10/26 at [facility]. At 3am developed fever 102 and chills, dysuria, and left flank pain. Postop hematuria is resolved. She arrives to the ER for persistent fever, malaise and treatment of urosepsis. She got standard preop abx and ppx augmentin which she took one dose PTA In Sept had left ureteral stent placed for stone was found to have Staph aureus UTI and got 1 week of bactrim    Currently on ertapenem     PAST MEDICAL & SURGICAL HISTORY:  Thyroid nodule: s/p biopsy   Hematuria  Genital sore: recurrent w/Menstrual period, uses PRN Valcyclovir.  Type 2 diabetes mellitus without complication, unspecified long term insulin use status  Hernia: umbilical  Kidney stones  Morbid obesity due to excess calories  Chronic anemia  Incisional hernia  Vitamin B12 deficiency  DM (diabetes mellitus)  History of hernia repair: x 3  H/O: : x3  H/O abdominoplasty: 2015  History of incisional hernia repair: x 3  History of : x 3 (, , )      MEDICATIONS  (STANDING):  cefTRIAXone   IVPB 1 Gram(s) IV Intermittent Once  sodium chloride 0.9% Bolus 500 milliLiter(s) IV Bolus every 15 minutes    MEDICATIONS  (PRN):      FAMILY HISTORY:  Family history of diabetes mellitus (DM) (Sibling)      Allergies    many vegetables and fruits (Urticaria)  No Known Drug Allergies (27 Oct 2017 18:15)      PAST MEDICAL & SURGICAL HISTORY:  Thyroid nodule: s/p biopsy   Hematuria  Genital sore: recurrent w/Menstrual period, uses PRN Valcyclovir.  Type 2 diabetes mellitus without complication, unspecified long term insulin use status  Hernia: umbilical  Kidney stones  Morbid obesity due to excess calories  Chronic anemia  Incisional hernia  Vitamin B12 deficiency  DM (diabetes mellitus)  History of hernia repair: x 3  H/O: : x3  H/O abdominoplasty: 2015  History of incisional hernia repair: x 3  History of : x 3 (, , )      Allergies  mc(bitter melon)  okra  papaya (Urticaria)  many vegitables, and fruites (Urticaria)  No Known Drug Allergies        ANTIMICROBIALS:  ertapenem  IVPB        OTHER MEDS: MEDICATIONS  (STANDING):  acetaminophen   Tablet 650 every 6 hours PRN  dextrose 50% Injectable 12.5 once  dextrose 50% Injectable 25 once  dextrose 50% Injectable 25 once  dextrose Gel 1 once PRN  docusate sodium 100 three times a day  glucagon  Injectable 1 once PRN  heparin  Injectable 5000 every 8 hours  influenza   Vaccine 0.5 once  insulin lispro (HumaLOG) corrective regimen sliding scale  three times a day before meals  insulin lispro (HumaLOG) corrective regimen sliding scale  at bedtime  oxyCODONE    5 mG/acetaminophen 325 mG 1 every 4 hours PRN  phenazopyridine 100 every 8 hours  senna 2 at bedtime PRN  tamsulosin 0.4 at bedtime      SOCIAL HISTORY:  [ ] etoh [ ] tobacco [ ] former smoker [ ] IVDU    FAMILY HISTORY:  Family history of diabetes mellitus (DM) (Sibling)      REVIEW OF SYSTEMS  [  ] ROS unobtainable because:    [ x ] All other systems negative except as noted below:	    Constitutional:  [ x] fever [ ] weight loss  Skin:  [ ] rash [ ] phlebitis	  Eyes: [ ] icterus [ ] inflammation	  ENMT: [ ] discharge [ ] thrush [ ] ulcers [ ] exudates  Respiratory: [ ] dyspnea [ ] hemoptysis [ ] cough [ ] sputum	  Cardiovascular:  [ ] chest pain [ ] palpitations [ ] edema	  Gastrointestinal:  [ ] nausea [ ] vomiting [ ] diarrhea [ ] constipation [ ] pain	  Genitourinary:  [x ] dysuria [ ] frequency [ ] hematuria [ ] discharge [ x] flank pain  Musculoskeletal:  [ ] myalgias [ ] arthralgias [ ] arthritis	  Neurological:  [ ] headache [ ] seizures	  Psychiatric:  [ ] anxiety [ ] depression	  Hematology/Lymphatics:  [ ] lymphadenopathy  Endocrine:  [ ] adrenal [ ] thyroid  Allergic/Immunologic:	 [ ] transplant [ ] seasonal    Vital Signs Last 24 Hrs  T(F): 98.6 (10-28-17 @ 15:02), Max: 103.1 (10-28-17 @ 12:19)    Vital Signs Last 24 Hrs  HR: 87 (10-28-17 @ 15:02) (61 - 98)  BP: 127/68 (10-28-17 @ 15:02) (101/72 - 136/67)  RR: 16 (10-28-17 @ 15:02)  SpO2: 95% (10-28-17 @ 15:02) (93% - 98%)  Wt(kg): --    PHYSICAL EXAM:  General: non-toxic, obese  HEAD/EYES: anicteric, PERRL, pallor +  ENT:  supple  Cardiovascular:   S1, S2  Respiratory:  clear bilaterally  GI:  soft, pendulous abdomen, non-tender,  left flank ink cherri of site, normal bowel sounds  :  no CVA tenderness   Musculoskeletal:  no synovitis  Neurologic:  grossly non-focal  Skin:  no rash  Lymph: no lymphadenopathy  Psychiatric:  appropriate affect  Vascular:  no phlebitis                                10.2   12.9  )-----------( 296      ( 28 Oct 2017 06:35 )             30.4       10    138  |  101  |  7   ----------------------------<  152<H>  3.3<L>   |  22  |  0.70    Ca    8.6      28 Oct 2017 06:35    TPro  7.9  /  Alb  3.9  /  TBili  0.4  /  DBili  x   /  AST  18  /  ALT  29  /  AlkPhos  65  10      Urinalysis Basic - ( 27 Oct 2017 19:41 )    Color: PL Yellow / Appearance: Clear / S.009 / pH: x  Gluc: x / Ketone: Negative  / Bili: Negative / Urobili: Negative   Blood: x / Protein: Trace / Nitrite: Negative   Leuk Esterase: Moderate / RBC: 10-25 /HPF / WBC 25-50 /HPF   Sq Epi: x / Non Sq Epi: Occasional /HPF / Bacteria: x        MICROBIOLOGY:    Urine Microscopic-Add On (NC) (10.27.17 @ 19:41)    Epithelial Cells: Occasional /HPF    Red Blood Cell - Urine: 10-25 /HPF    White Blood Cell - Urine: 25-50 /HPF    Culture - Urine (17 @ 01:34)    -  Ampicillin/Sulbactam: S <=8/4    -  Cefazolin: S <=4    -  Trimethoprim/Sulfamethoxazole: S <=0.5/9.5    -  Vancomycin: S 2    -  Ciprofloxacin: S <=1    -  Gentamicin: S <=1    -  Levofloxacin: S <=0.5    -  Nitrofurantoin: S <=32    -  Oxacillin: S 0.5    -  Penicillin: R >8    -  RIF- Rifampin: S <=1    -  Tetra/Doxy: S <=1    Specimen Source: .Urine Bladder (from O.R.)    Culture Results:   6,000 CFU/ml Staphylococcus aureus    Organism Identification: Staphylococcus aureus    Organism: Staphylococcus aureus    Method Type: ERIK    Culture - Urine (09.15.17 @ 00:35)    -  Ampicillin/Sulbactam: S <=8/4    -  Ciprofloxacin: S <=1    -  Cefazolin: S <=4    -  Vancomycin: S 2    -  Trimethoprim/Sulfamethoxazole: S <=0.5/9.5    -  Tetra/Doxy: S <=1    -  RIF- Rifampin: S <=1    -  Penicillin: R >8    -  Oxacillin: S 0.5    -  Nitrofurantoin: S <=32    -  Levofloxacin: S <=0.5    -  Gentamicin: S <=1    Specimen Source: .Urine Clean Catch (Midstream)    Culture Results:   >100,000 CFU/ml Staphylococcus aureus    Organism Identification: Staphylococcus aureus    Organism: Staphylococcus aureus    Method Type: ERIK          v            RADIOLOGY:

## 2017-10-28 NOTE — PROGRESS NOTE ADULT - SUBJECTIVE AND OBJECTIVE BOX
UROLOGY DAILY PROGRESS NOTE:     Subjective: Patient seen and examined at bedside. Continues to spike fever TMax 103 at 2am.      Objective:  Vital signs  T(F): , Max: 103 (10-28-17 @ 02:02)  HR: 96 (10-28-17 @ 05:10)  BP: 126/76 (10-28-17 @ 05:10)  SpO2: 97% (10-28-17 @ 05:10)    I&O's Summary    27 Oct 2017 07:01  -  28 Oct 2017 07:00  --------------------------------------------------------  IN: 2340 mL / OUT: 650 mL / NET: 1690 mL    Gen: NAD  Abd: Soft, NT, ND    Labs:  10-28  12.9  / 30.4  /0.70   10-27  13.7  / 34.2  /0.75                           10.2   12.9  )-----------( 296      ( 28 Oct 2017 06:35 )             30.4     10-28    138  |  101  |  7   ----------------------------<  152<H>  3.3<L>   |  22  |  0.70    Ca    8.6      28 Oct 2017 06:35    TPro  7.9  /  Alb  3.9  /  TBili  0.4  /  DBili  x   /  AST  18  /  ALT  29  /  AlkPhos  65  10-27    PT/INR - ( 27 Oct 2017 18:10 )   PT: 12.7 sec;   INR: 1.17 ratio         PTT - ( 27 Oct 2017 18:10 )  PTT:25.9 sec    Urine Cx: pending  Blood Cx: pending

## 2017-10-28 NOTE — PROGRESS NOTE ADULT - ASSESSMENT
41yo female s/p left urs and stent placement POD2 now with urosepsis  -Switched to ertapenem  -F/u KUB to check for stent placement  -F/u cultures  -Trend fever curve   -Pain control   -OOB, DVT PPx

## 2017-10-28 NOTE — CONSULT NOTE ADULT - ATTENDING COMMENTS
Patient seen and examined with Dr. Anthony. I agree with her hsitory, exam findings and plans as noted above.    Patient with history of obesity, DM on metformin, recurrent renal calculi with history of  UTIs, and requiring stent placement. September urine culture with Staph Aureus, but no blood cultures available from that admission.  Returned to hospital post urinary stent replacement procedure with high fevers and chills, blood cultures sent and pending    Agree with Vanco/zosyn pending blood cultures and urine cultures for organism      ID coverage is available over the weekend by calling [phone]

## 2017-10-29 LAB
ANION GAP SERPL CALC-SCNC: 16 MMOL/L — SIGNIFICANT CHANGE UP (ref 5–17)
BUN SERPL-MCNC: 6 MG/DL — LOW (ref 7–23)
CALCIUM SERPL-MCNC: 9 MG/DL — SIGNIFICANT CHANGE UP (ref 8.4–10.5)
CHLORIDE SERPL-SCNC: 103 MMOL/L — SIGNIFICANT CHANGE UP (ref 96–108)
CO2 SERPL-SCNC: 17 MMOL/L — LOW (ref 22–31)
CREAT SERPL-MCNC: 0.65 MG/DL — SIGNIFICANT CHANGE UP (ref 0.5–1.3)
GLUCOSE SERPL-MCNC: 123 MG/DL — HIGH (ref 70–99)
HCT VFR BLD CALC: 32.3 % — LOW (ref 34.5–45)
HGB BLD-MCNC: 10.8 G/DL — LOW (ref 11.5–15.5)
MCHC RBC-ENTMCNC: 27.5 PG — SIGNIFICANT CHANGE UP (ref 27–34)
MCHC RBC-ENTMCNC: 33.4 GM/DL — SIGNIFICANT CHANGE UP (ref 32–36)
MCV RBC AUTO: 82.2 FL — SIGNIFICANT CHANGE UP (ref 80–100)
PLATELET # BLD AUTO: 253 K/UL — SIGNIFICANT CHANGE UP (ref 150–400)
POTASSIUM SERPL-MCNC: 4.5 MMOL/L — SIGNIFICANT CHANGE UP (ref 3.5–5.3)
POTASSIUM SERPL-SCNC: 4.5 MMOL/L — SIGNIFICANT CHANGE UP (ref 3.5–5.3)
RBC # BLD: 3.93 M/UL — SIGNIFICANT CHANGE UP (ref 3.8–5.2)
RBC # FLD: 13.2 % — SIGNIFICANT CHANGE UP (ref 10.3–14.5)
SODIUM SERPL-SCNC: 136 MMOL/L — SIGNIFICANT CHANGE UP (ref 135–145)
WBC # BLD: 14.8 K/UL — HIGH (ref 3.8–10.5)
WBC # FLD AUTO: 14.8 K/UL — HIGH (ref 3.8–10.5)

## 2017-10-29 PROCEDURE — 74177 CT ABD & PELVIS W/CONTRAST: CPT | Mod: 26

## 2017-10-29 PROCEDURE — 99232 SBSQ HOSP IP/OBS MODERATE 35: CPT | Mod: GC

## 2017-10-29 PROCEDURE — 99232 SBSQ HOSP IP/OBS MODERATE 35: CPT

## 2017-10-29 PROCEDURE — 71010: CPT | Mod: 26

## 2017-10-29 RX ORDER — ACETAMINOPHEN 500 MG
1000 TABLET ORAL ONCE
Qty: 0 | Refills: 0 | Status: COMPLETED | OUTPATIENT
Start: 2017-10-29 | End: 2017-10-29

## 2017-10-29 RX ADMIN — Medication 800 MILLIGRAM(S): at 14:52

## 2017-10-29 RX ADMIN — Medication 100 MILLIGRAM(S): at 05:33

## 2017-10-29 RX ADMIN — Medication 800 MILLIGRAM(S): at 03:35

## 2017-10-29 RX ADMIN — Medication 300 MILLIGRAM(S): at 10:27

## 2017-10-29 RX ADMIN — Medication 650 MILLIGRAM(S): at 10:32

## 2017-10-29 RX ADMIN — Medication 650 MILLIGRAM(S): at 23:28

## 2017-10-29 RX ADMIN — Medication 400 MILLIGRAM(S): at 16:29

## 2017-10-29 RX ADMIN — Medication 650 MILLIGRAM(S): at 01:54

## 2017-10-29 RX ADMIN — HEPARIN SODIUM 5000 UNIT(S): 5000 INJECTION INTRAVENOUS; SUBCUTANEOUS at 22:11

## 2017-10-29 RX ADMIN — Medication 100 MILLIGRAM(S): at 22:11

## 2017-10-29 RX ADMIN — Medication 300 MILLIGRAM(S): at 23:42

## 2017-10-29 RX ADMIN — Medication 800 MILLIGRAM(S): at 03:05

## 2017-10-29 RX ADMIN — PIPERACILLIN AND TAZOBACTAM 25 GRAM(S): 4; .5 INJECTION, POWDER, LYOPHILIZED, FOR SOLUTION INTRAVENOUS at 16:29

## 2017-10-29 RX ADMIN — Medication 100 MILLIGRAM(S): at 22:12

## 2017-10-29 RX ADMIN — PIPERACILLIN AND TAZOBACTAM 25 GRAM(S): 4; .5 INJECTION, POWDER, LYOPHILIZED, FOR SOLUTION INTRAVENOUS at 05:33

## 2017-10-29 RX ADMIN — HEPARIN SODIUM 5000 UNIT(S): 5000 INJECTION INTRAVENOUS; SUBCUTANEOUS at 05:33

## 2017-10-29 RX ADMIN — TAMSULOSIN HYDROCHLORIDE 0.4 MILLIGRAM(S): 0.4 CAPSULE ORAL at 22:11

## 2017-10-29 RX ADMIN — Medication 2: at 12:19

## 2017-10-29 RX ADMIN — Medication 100 MILLIGRAM(S): at 14:52

## 2017-10-29 RX ADMIN — HEPARIN SODIUM 5000 UNIT(S): 5000 INJECTION INTRAVENOUS; SUBCUTANEOUS at 14:52

## 2017-10-29 NOTE — PROVIDER CONTACT NOTE (OTHER) - ASSESSMENT
Pt temp retaken after giving Tylenol and was 101.1. V/S Pt temp retaken after giving Tylenol and was 101.1. V/S: /57, RR: 18, HR: 105, O2 sat: 95% on room air

## 2017-10-29 NOTE — PROGRESS NOTE ADULT - SUBJECTIVE AND OBJECTIVE BOX
UROLOGY DAILY PROGRESS NOTE:     Subjective: Patient seen and examined at bedside.     Objective:  Vital signs  T(F): , Max: 103.1 (10-28-17 @ 12:19)  HR: 100 (10-29-17 @ 10:52)  BP: 138/79 (10-29-17 @ 10:52)  SpO2: 93% (10-29-17 @ 10:52)  Wt(kg): --    I&O's Summary    28 Oct 2017 07:01  -  29 Oct 2017 07:00  --------------------------------------------------------  IN: 3020 mL / OUT: 600 mL / NET: 2420 mL    29 Oct 2017 07:01  -  29 Oct 2017 11:04  --------------------------------------------------------  IN: 440 mL / OUT: 0 mL / NET: 440 mL      Gen: NAD  Abd: Soft, NT, ND    Labs:  10-29  14.8  / 32.3  /0.65   10-28  12.9  / 30.4  /0.70                           10.8   14.8  )-----------( 253      ( 29 Oct 2017 09:38 )             32.3     10-29    136  |  103  |  6<L>  ----------------------------<  123<H>  4.5   |  17<L>  |  0.65    Ca    9.0      29 Oct 2017 09:38    TPro  7.9  /  Alb  3.9  /  TBili  0.4  /  DBili  x   /  AST  18  /  ALT  29  /  AlkPhos  65  10-27    PT/INR - ( 27 Oct 2017 18:10 )   PT: 12.7 sec;   INR: 1.17 ratio         PTT - ( 27 Oct 2017 18:10 )  PTT:25.9 sec    Urine Cx: Neg  Bcx:NGTD    UCx #2:pending  BCx #2: pending

## 2017-10-29 NOTE — PROGRESS NOTE ADULT - SUBJECTIVE AND OBJECTIVE BOX
INFECTIOUS DISEASES FOLLOW UP-- Amarilys Blue  [phone]    This is a follow up note for this  42yFemale with renal calculi s/p stent placement with persistent fevers and she feels badly and is describing rigors  Sepsis      ROS:  CONSTITUTIONAL:   fever, good appetite  CARDIOVASCULAR:  No chest pain or palpitations  RESPIRATORY:  No dyspnea  GASTROINTESTINAL:  No nausea, vomiting, diarrhea, or abdominal pain  GENITOURINARY:  No dysuria new rodriguez placed  NEUROLOGIC:  No headache,     Allergies    mc(bitter melon)  okra  papaya (Urticaria)  many vegitables, and fruites (Urticaria)  No Known Drug Allergies    Intolerances        ANTIBIOTICS/RELEVANT:  antimicrobials  piperacillin/tazobactam IVPB. 3.375 Gram(s) IV Intermittent every 8 hours  vancomycin  IVPB 1500 milliGRAM(s) IV Intermittent every 12 hours    immunologic:  influenza   Vaccine 0.5 milliLiter(s) IntraMuscular once    OTHER:  acetaminophen   Tablet 650 milliGRAM(s) Oral every 6 hours PRN  acetaminophen  IVPB 1000 milliGRAM(s) IV Intermittent once  dextrose 5%. 1000 milliLiter(s) IV Continuous <Continuous>  dextrose 50% Injectable 12.5 Gram(s) IV Push once  dextrose 50% Injectable 25 Gram(s) IV Push once  dextrose 50% Injectable 25 Gram(s) IV Push once  dextrose Gel 1 Dose(s) Oral once PRN  docusate sodium 100 milliGRAM(s) Oral three times a day  glucagon  Injectable 1 milliGRAM(s) IntraMuscular once PRN  heparin  Injectable 5000 Unit(s) SubCutaneous every 8 hours  ibuprofen  Tablet 800 milliGRAM(s) Oral every 6 hours PRN  insulin lispro (HumaLOG) corrective regimen sliding scale   SubCutaneous three times a day before meals  insulin lispro (HumaLOG) corrective regimen sliding scale   SubCutaneous at bedtime  ondansetron Injectable 4 milliGRAM(s) IV Push every 8 hours PRN  oxyCODONE    5 mG/acetaminophen 325 mG 1 Tablet(s) Oral every 4 hours PRN  phenazopyridine 100 milliGRAM(s) Oral every 8 hours  senna 2 Tablet(s) Oral at bedtime PRN  sodium chloride 0.9% Bolus 500 milliLiter(s) IV Bolus every 15 minutes  sodium chloride 0.9%. 1000 milliLiter(s) IV Continuous <Continuous>  tamsulosin 0.4 milliGRAM(s) Oral at bedtime      Objective:  Vital Signs Last 24 Hrs  T(C): 36.6 (29 Oct 2017 14:33), Max: 39.4 (29 Oct 2017 01:47)  T(F): 97.9 (29 Oct 2017 14:33), Max: 103 (29 Oct 2017 01:47)  HR: 80 (29 Oct 2017 14:33) (80 - 119)  BP: 114/71 (29 Oct 2017 14:33) (100/62 - 138/79)  BP(mean): --  RR: 16 (29 Oct 2017 14:33) (16 - 18)  SpO2: 94% (29 Oct 2017 14:33) (93% - 99%)    PHYSICAL EXAM:  Constitutional:no acute distress, but fatigued  Eyes:ALDAIR, EOMI  Ear/Nose/Throat: no oral lesions, 	  Respiratory: clear BL  Cardiovascular: S1S2 tachycardic  Gastrointestinal:soft, (+) BS, no tenderness except CVA bilaterally and lower pelvic area pain  Extremities:no e/e/c  No Lymphadenopathy  IV sites not inflammed.    LABS:                        10.8   14.8  )-----------( 253      ( 29 Oct 2017 09:38 )             32.3     10    136  |  103  |  6<L>  ----------------------------<  123<H>  4.5   |  17<L>  |  0.65    Ca    9.0      29 Oct 2017 09:38    TPro  7.9  /  Alb  3.9  /  TBili  0.4  /  DBili  x   /  AST  18  /  ALT  29  /  AlkPhos  65  10-27    PT/INR - ( 27 Oct 2017 18:10 )   PT: 12.7 sec;   INR: 1.17 ratio         PTT - ( 27 Oct 2017 18:10 )  PTT:25.9 sec  Urinalysis Basic - ( 27 Oct 2017 19:41 )    Color: PL Yellow / Appearance: Clear / S.009 / pH: x  Gluc: x / Ketone: Negative  / Bili: Negative / Urobili: Negative   Blood: x / Protein: Trace / Nitrite: Negative   Leuk Esterase: Moderate / RBC: 10-25 /HPF / WBC 25-50 /HPF   Sq Epi: x / Non Sq Epi: Occasional /HPF / Bacteria: x        MICROBIOLOGY:            RECENT CULTURES:  10-27 @ 22:28  .Urine Clean Catch (Midstream)  --  --  --    No growth  --  10-27 @ 21:24  .Blood Blood-Peripheral  --  --  --    No growth to date.  --      RADIOLOGY & ADDITIONAL STUDIES:

## 2017-10-29 NOTE — PROGRESS NOTE ADULT - ASSESSMENT
43yo female s/p left urs and stent placement POD2 now with urosepsis  -Switched to Vanco/zosyn  -KUB shows stent in place   -F/u cultures  -Trend fever curve   -Pain control   -OOB, DVT PPx  -Appreciate ID recs

## 2017-10-29 NOTE — PROGRESS NOTE ADULT - ASSESSMENT
42 y F PMh DM, nephrolithiasis s/p left ureteroscopy and lithotripsy of stone with ureteral stent replacement 10/26, h/o Staph aureus in urine 9/2017 treated with bactrim, presenting with high grade fever, chills, dysuria and flank pain x 1 day. Admitted for urosepsis  Tmax 103 F, pain now controlled  Would stop Ertapenem and start Zsoyn 3.375 iv q8h, vancomycin 1.5 mg q12h. Check vancomycin trough prior to 4th dose  Follow blood and urine cultures  Would repeat blood cultures  She needs imaging of her renal collecting system to r/o abscess, persistent hydronephrosis given her persistent hectic fevers  check Vanco trough prior to fourth dose

## 2017-10-30 LAB
ANION GAP SERPL CALC-SCNC: 16 MMOL/L — SIGNIFICANT CHANGE UP (ref 5–17)
BUN SERPL-MCNC: 6 MG/DL — LOW (ref 7–23)
CALCIUM SERPL-MCNC: 8.6 MG/DL — SIGNIFICANT CHANGE UP (ref 8.4–10.5)
CHLORIDE SERPL-SCNC: 101 MMOL/L — SIGNIFICANT CHANGE UP (ref 96–108)
CK MB CFR SERPL CALC: 1 NG/ML — SIGNIFICANT CHANGE UP (ref 0–3.8)
CK SERPL-CCNC: 51 U/L — SIGNIFICANT CHANGE UP (ref 25–170)
CO2 SERPL-SCNC: 21 MMOL/L — LOW (ref 22–31)
CREAT SERPL-MCNC: 0.66 MG/DL — SIGNIFICANT CHANGE UP (ref 0.5–1.3)
CULTURE RESULTS: NO GROWTH — SIGNIFICANT CHANGE UP
GLUCOSE SERPL-MCNC: 165 MG/DL — HIGH (ref 70–99)
HCT VFR BLD CALC: 29.5 % — LOW (ref 34.5–45)
HCT VFR BLD CALC: 30.8 % — LOW (ref 34.5–45)
HGB BLD-MCNC: 10 G/DL — LOW (ref 11.5–15.5)
HGB BLD-MCNC: 9.6 G/DL — LOW (ref 11.5–15.5)
MCHC RBC-ENTMCNC: 26.7 PG — LOW (ref 27–34)
MCHC RBC-ENTMCNC: 26.7 PG — LOW (ref 27–34)
MCHC RBC-ENTMCNC: 32.5 GM/DL — SIGNIFICANT CHANGE UP (ref 32–36)
MCHC RBC-ENTMCNC: 32.6 GM/DL — SIGNIFICANT CHANGE UP (ref 32–36)
MCV RBC AUTO: 81.9 FL — SIGNIFICANT CHANGE UP (ref 80–100)
MCV RBC AUTO: 82 FL — SIGNIFICANT CHANGE UP (ref 80–100)
PLATELET # BLD AUTO: 272 K/UL — SIGNIFICANT CHANGE UP (ref 150–400)
PLATELET # BLD AUTO: 285 K/UL — SIGNIFICANT CHANGE UP (ref 150–400)
POTASSIUM SERPL-MCNC: 3.6 MMOL/L — SIGNIFICANT CHANGE UP (ref 3.5–5.3)
POTASSIUM SERPL-SCNC: 3.6 MMOL/L — SIGNIFICANT CHANGE UP (ref 3.5–5.3)
RBC # BLD: 3.6 M/UL — LOW (ref 3.8–5.2)
RBC # BLD: 3.75 M/UL — LOW (ref 3.8–5.2)
RBC # FLD: 13.1 % — SIGNIFICANT CHANGE UP (ref 10.3–14.5)
RBC # FLD: 13.1 % — SIGNIFICANT CHANGE UP (ref 10.3–14.5)
SODIUM SERPL-SCNC: 138 MMOL/L — SIGNIFICANT CHANGE UP (ref 135–145)
SPECIMEN SOURCE: SIGNIFICANT CHANGE UP
TROPONIN T SERPL-MCNC: <0.01 NG/ML — SIGNIFICANT CHANGE UP (ref 0–0.06)
VANCOMYCIN TROUGH SERPL-MCNC: 8.6 UG/ML — LOW (ref 10–20)
WBC # BLD: 12.6 K/UL — HIGH (ref 3.8–10.5)
WBC # BLD: 13.1 K/UL — HIGH (ref 3.8–10.5)
WBC # FLD AUTO: 12.6 K/UL — HIGH (ref 3.8–10.5)
WBC # FLD AUTO: 13.1 K/UL — HIGH (ref 3.8–10.5)

## 2017-10-30 PROCEDURE — 99232 SBSQ HOSP IP/OBS MODERATE 35: CPT

## 2017-10-30 PROCEDURE — 71275 CT ANGIOGRAPHY CHEST: CPT | Mod: 26

## 2017-10-30 PROCEDURE — 93010 ELECTROCARDIOGRAM REPORT: CPT

## 2017-10-30 RX ORDER — IPRATROPIUM/ALBUTEROL SULFATE 18-103MCG
3 AEROSOL WITH ADAPTER (GRAM) INHALATION ONCE
Qty: 0 | Refills: 0 | Status: COMPLETED | OUTPATIENT
Start: 2017-10-30 | End: 2017-10-30

## 2017-10-30 RX ORDER — ACETAMINOPHEN 500 MG
1000 TABLET ORAL ONCE
Qty: 0 | Refills: 0 | Status: COMPLETED | OUTPATIENT
Start: 2017-10-30 | End: 2017-10-30

## 2017-10-30 RX ORDER — CEFEPIME 1 G/1
2000 INJECTION, POWDER, FOR SOLUTION INTRAMUSCULAR; INTRAVENOUS EVERY 12 HOURS
Qty: 0 | Refills: 0 | Status: DISCONTINUED | OUTPATIENT
Start: 2017-10-30 | End: 2017-10-31

## 2017-10-30 RX ORDER — VANCOMYCIN HCL 1 G
1500 VIAL (EA) INTRAVENOUS EVERY 8 HOURS
Qty: 0 | Refills: 0 | Status: DISCONTINUED | OUTPATIENT
Start: 2017-10-30 | End: 2017-10-31

## 2017-10-30 RX ADMIN — HEPARIN SODIUM 5000 UNIT(S): 5000 INJECTION INTRAVENOUS; SUBCUTANEOUS at 21:26

## 2017-10-30 RX ADMIN — Medication 800 MILLIGRAM(S): at 01:26

## 2017-10-30 RX ADMIN — Medication 100 MILLIGRAM(S): at 05:58

## 2017-10-30 RX ADMIN — Medication 400 MILLIGRAM(S): at 21:28

## 2017-10-30 RX ADMIN — CEFEPIME 100 MILLIGRAM(S): 1 INJECTION, POWDER, FOR SOLUTION INTRAMUSCULAR; INTRAVENOUS at 19:53

## 2017-10-30 RX ADMIN — HEPARIN SODIUM 5000 UNIT(S): 5000 INJECTION INTRAVENOUS; SUBCUTANEOUS at 13:48

## 2017-10-30 RX ADMIN — Medication 300 MILLIGRAM(S): at 13:24

## 2017-10-30 RX ADMIN — Medication 100 MILLIGRAM(S): at 21:24

## 2017-10-30 RX ADMIN — Medication 2: at 17:25

## 2017-10-30 RX ADMIN — Medication 800 MILLIGRAM(S): at 19:53

## 2017-10-30 RX ADMIN — Medication 650 MILLIGRAM(S): at 09:58

## 2017-10-30 RX ADMIN — Medication 3 MILLILITER(S): at 20:45

## 2017-10-30 RX ADMIN — Medication 300 MILLIGRAM(S): at 21:27

## 2017-10-30 RX ADMIN — Medication 100 MILLIGRAM(S): at 05:59

## 2017-10-30 RX ADMIN — Medication 100 MILLIGRAM(S): at 13:48

## 2017-10-30 RX ADMIN — Medication 800 MILLIGRAM(S): at 14:20

## 2017-10-30 RX ADMIN — Medication 800 MILLIGRAM(S): at 13:48

## 2017-10-30 RX ADMIN — PIPERACILLIN AND TAZOBACTAM 25 GRAM(S): 4; .5 INJECTION, POWDER, LYOPHILIZED, FOR SOLUTION INTRAVENOUS at 03:44

## 2017-10-30 RX ADMIN — Medication 800 MILLIGRAM(S): at 00:56

## 2017-10-30 RX ADMIN — SODIUM CHLORIDE 75 MILLILITER(S): 9 INJECTION INTRAMUSCULAR; INTRAVENOUS; SUBCUTANEOUS at 13:28

## 2017-10-30 RX ADMIN — HEPARIN SODIUM 5000 UNIT(S): 5000 INJECTION INTRAVENOUS; SUBCUTANEOUS at 05:59

## 2017-10-30 RX ADMIN — TAMSULOSIN HYDROCHLORIDE 0.4 MILLIGRAM(S): 0.4 CAPSULE ORAL at 21:26

## 2017-10-30 NOTE — PROGRESS NOTE ADULT - ASSESSMENT
42 y F PMh DM, nephrolithiasis s/p left ureteroscopy and lithotripsy of stone with ureteral stent replacement 10/26, h/o Staph aureus in urine 9/2017 treated with bactrim, presenting with high grade fever, chills, dysuria and flank pain x 1 day. Admitted for urosepsis  She has remained febrile with high fevers despite broad spectrum antibiotics. A follow up CT scan shows pyelonephritis but no abscess and no hydronephrosis    Unclear at this point if fevers are still related to the pyelonephritis or antibiotics or another focus    Would:  Repeat blood cultures  obtain TTE because patient has a history of MSSA urinary tract infection and need to r/o underlying endocarditis  Will change antibiotic from zosyn to Cefepime in case there is a pcn allergy component  IF fevers persist will also need imaging of her spine.

## 2017-10-30 NOTE — PROGRESS NOTE ADULT - ASSESSMENT
This is a 42 year old female with acute onset dyspnea and pleuritic chest pain, concern for PE  - stat EKG, Cardiac enzymes, CBC, BMP  - CT PE protocol  - D/w Dr. Quispe

## 2017-10-30 NOTE — PROGRESS NOTE ADULT - ASSESSMENT
s/p JAY, now with fever   - labs  - f/u cultures  - cont Abx  - f/u official CT  - Vanc trough tonight s/p URS, now with fever   - labs  - f/u cultures  - cont Abx  - f/u official CT  - Vanc trough tonight  -Incentive spirometer

## 2017-10-30 NOTE — PROGRESS NOTE ADULT - SUBJECTIVE AND OBJECTIVE BOX
Subjective  Patient c/o R sided pleuritic CP and Dyspnea  Desatting to 80% on RA, now satting 96% on 6 L NC    Objective  Vital signs  T(F): , Max: 102.6 (10-30-17 @ 00:51)  HR: 75 (10-30-17 @ 17:04)  BP: 110/53 (10-30-17 @ 17:04)  SpO2: 94% (10-30-17 @ 17:04)  F: 1850 - clear yellow    Gen: NAD  CV/Resp: tachycardic, crackles at Right lower anterior lobe  Abd: Soft, non-tender, no flank pain  : Shaikh secure    Labs  10-30 @ 09:27  WBC 13.1  / Hct 30.8  / SCr --       10-29 @ 09:38    WBC 14.8  / Hct 32.3  / SCr 0.65       Urine Cx: No growth    Imaging  < from: CT Abdomen and Pelvis w/ IV Cont (10.29.17 @ 20:02) >    IMPRESSION: Interval placement of left ureteral stent. Left proximal   ureteral stone is no longer present. Striated left nephrogram consistent   with pyelonephritis. No abscess.    < end of copied text >

## 2017-10-30 NOTE — PROGRESS NOTE ADULT - SUBJECTIVE AND OBJECTIVE BOX
INFECTIOUS DISEASES FOLLOW UP-- Amarilys Blue  [phone]    This is a follow up note for this  42yFemale with sepsis/fevers following a  procedure with stone removal and stent placement. Cultures since admission have been NGTD but she remains febrile feeling poorly.   Sepsis      ROS:  CONSTITUTIONAL: + fever, fair appetite  CARDIOVASCULAR:  No chest pain or palpitations  RESPIRATORY:  No dyspnea  GASTROINTESTINAL:  No nausea, vomiting, diarrhea, or abdominal pain  GENITOURINARY:  No dysuria  NEUROLOGIC:  No headache,   c/o rigor like sxs. and some back pain c-spin- thoracic area    Allergies    mc(bitter melon)  okra  papaya (Urticaria)  many vegitables, and fruites (Urticaria)  No Known Drug Allergies    Intolerances        ANTIBIOTICS/RELEVANT:  antimicrobials  cefepime  IVPB 2000 milliGRAM(s) IV Intermittent every 12 hours  vancomycin  IVPB 1500 milliGRAM(s) IV Intermittent every 12 hours    immunologic:  influenza   Vaccine 0.5 milliLiter(s) IntraMuscular once    OTHER:  acetaminophen   Tablet 650 milliGRAM(s) Oral every 6 hours PRN  dextrose 5%. 1000 milliLiter(s) IV Continuous <Continuous>  dextrose 50% Injectable 12.5 Gram(s) IV Push once  dextrose 50% Injectable 25 Gram(s) IV Push once  dextrose 50% Injectable 25 Gram(s) IV Push once  dextrose Gel 1 Dose(s) Oral once PRN  docusate sodium 100 milliGRAM(s) Oral three times a day  glucagon  Injectable 1 milliGRAM(s) IntraMuscular once PRN  heparin  Injectable 5000 Unit(s) SubCutaneous every 8 hours  ibuprofen  Tablet 800 milliGRAM(s) Oral every 6 hours PRN  insulin lispro (HumaLOG) corrective regimen sliding scale   SubCutaneous three times a day before meals  insulin lispro (HumaLOG) corrective regimen sliding scale   SubCutaneous at bedtime  ondansetron Injectable 4 milliGRAM(s) IV Push every 8 hours PRN  oxyCODONE    5 mG/acetaminophen 325 mG 1 Tablet(s) Oral every 4 hours PRN  phenazopyridine 100 milliGRAM(s) Oral every 8 hours  senna 2 Tablet(s) Oral at bedtime PRN  sodium chloride 0.9% Bolus 500 milliLiter(s) IV Bolus every 15 minutes  sodium chloride 0.9%. 1000 milliLiter(s) IV Continuous <Continuous>  tamsulosin 0.4 milliGRAM(s) Oral at bedtime      Objective:  Vital Signs Last 24 Hrs  T(C): 38.6 (30 Oct 2017 09:30), Max: 39.5 (29 Oct 2017 16:30)  T(F): 101.4 (30 Oct 2017 09:30), Max: 103.1 (29 Oct 2017 16:30)  HR: 102 (30 Oct 2017 09:30) (78 - 117)  BP: 123/75 (30 Oct 2017 09:30) (107/70 - 132/84)  BP(mean): --  RR: 19 (30 Oct 2017 09:30) (16 - 20)  SpO2: 95% (30 Oct 2017 09:30) (94% - 96%)    PHYSICAL EXAM:  Constitutional:no acute distress  Eyes:ALDAIR, EOMI  Ear/Nose/Throat: no oral lesions, 	  Respiratory: clear BL  Cardiovascular: S1S2  Gastrointestinal:soft, (+) BS, no tenderness  Extremities:no e/e/c  No Lymphadenopathy  IV sites not inflammed.    LABS:                        10.0   13.1  )-----------( 272      ( 30 Oct 2017 09:27 )             30.8     10-29    136  |  103  |  6<L>  ----------------------------<  123<H>  4.5   |  17<L>  |  0.65    Ca    9.0      29 Oct 2017 09:38            MICROBIOLOGY:            RECENT CULTURES:  10-29 @ 08:57  .Urine Clean Catch (Midstream)  --  --  --    No growth  --  10-29 @ 05:56  .Blood Blood  --  --  --    No growth to date.  --  10-27 @ 22:28  .Urine Clean Catch (Midstream)  --  --  --    No growth  --  10-27 @ 21:24  .Blood Blood-Peripheral  --  --  --    No growth to date.  --      RADIOLOGY & ADDITIONAL STUDIES:    < from: CT Abdomen and Pelvis w/ IV Cont (10.29.17 @ 20:02) >    IMPRESSION: Interval placement of left ureteral stent. Left proximal   ureteral stone is no longer present. Striated left nephrogram consistent   with pyelonephritis. No abscess.    < end of copied text >

## 2017-10-30 NOTE — PROGRESS NOTE ADULT - SUBJECTIVE AND OBJECTIVE BOX
UROLOGY DAILY PROGRESS NOTE:     Subjective: Patient seen and examined at bedside. No overnight events. Febrile overnight.      Objective:  Vital signs  T(F): , Max: 103.1 (10-29-17 @ 16:30)  HR: 78 (10-30-17 @ 05:14)  BP: 107/70 (10-30-17 @ 05:14)  SpO2: 96% (10-30-17 @ 05:14)  Wt(kg): --    I&O's Summary    29 Oct 2017 07:01  -  30 Oct 2017 07:00  --------------------------------------------------------  IN: 2060 mL / OUT: 3850 mL / NET: -1790 mL        Gen: NAD  Pulm: No respiratory distress, no subcostal retractions  CV: RRR, no JVD  Abd: Soft, NT, ND  : rodriguez clear urine    Labs:  10-29  14.8  / 32.3  /0.65                           10.8   14.8  )-----------( 253      ( 29 Oct 2017 09:38 )             32.3     10-29    136  |  103  |  6<L>  ----------------------------<  123<H>  4.5   |  17<L>  |  0.65    Ca    9.0      29 Oct 2017 09:38          Urine Cx:  Culture - Urine (10.27.17 @ 22:28)    Specimen Source: .Urine Clean Catch (Midstream)    Culture Results:   No growth

## 2017-10-31 LAB
HCT VFR BLD CALC: 31.6 % — LOW (ref 34.5–45)
HGB BLD-MCNC: 9.9 G/DL — LOW (ref 11.5–15.5)
HIV 1+2 AB+HIV1 P24 AG SERPL QL IA: SIGNIFICANT CHANGE UP
MCHC RBC-ENTMCNC: 25.5 PG — LOW (ref 27–34)
MCHC RBC-ENTMCNC: 31.2 GM/DL — LOW (ref 32–36)
MCV RBC AUTO: 81.9 FL — SIGNIFICANT CHANGE UP (ref 80–100)
PLATELET # BLD AUTO: 311 K/UL — SIGNIFICANT CHANGE UP (ref 150–400)
RAPID RVP RESULT: SIGNIFICANT CHANGE UP
RBC # BLD: 3.86 M/UL — SIGNIFICANT CHANGE UP (ref 3.8–5.2)
RBC # FLD: 13.1 % — SIGNIFICANT CHANGE UP (ref 10.3–14.5)
VANCOMYCIN TROUGH SERPL-MCNC: 11.9 UG/ML — SIGNIFICANT CHANGE UP (ref 10–20)
WBC # BLD: 12.3 K/UL — HIGH (ref 3.8–10.5)
WBC # FLD AUTO: 12.3 K/UL — HIGH (ref 3.8–10.5)

## 2017-10-31 PROCEDURE — 99233 SBSQ HOSP IP/OBS HIGH 50: CPT

## 2017-10-31 PROCEDURE — 99232 SBSQ HOSP IP/OBS MODERATE 35: CPT

## 2017-10-31 PROCEDURE — 99223 1ST HOSP IP/OBS HIGH 75: CPT

## 2017-10-31 RX ORDER — IPRATROPIUM/ALBUTEROL SULFATE 18-103MCG
3 AEROSOL WITH ADAPTER (GRAM) INHALATION ONCE
Qty: 0 | Refills: 0 | Status: COMPLETED | OUTPATIENT
Start: 2017-10-31 | End: 2017-10-31

## 2017-10-31 RX ADMIN — Medication 100 MILLIGRAM(S): at 13:02

## 2017-10-31 RX ADMIN — Medication 1: at 12:01

## 2017-10-31 RX ADMIN — TAMSULOSIN HYDROCHLORIDE 0.4 MILLIGRAM(S): 0.4 CAPSULE ORAL at 21:52

## 2017-10-31 RX ADMIN — Medication 650 MILLIGRAM(S): at 06:32

## 2017-10-31 RX ADMIN — Medication 800 MILLIGRAM(S): at 13:02

## 2017-10-31 RX ADMIN — Medication 800 MILLIGRAM(S): at 13:30

## 2017-10-31 RX ADMIN — HEPARIN SODIUM 5000 UNIT(S): 5000 INJECTION INTRAVENOUS; SUBCUTANEOUS at 04:38

## 2017-10-31 RX ADMIN — ONDANSETRON 4 MILLIGRAM(S): 8 TABLET, FILM COATED ORAL at 22:36

## 2017-10-31 RX ADMIN — Medication 300 MILLIGRAM(S): at 04:38

## 2017-10-31 RX ADMIN — Medication 650 MILLIGRAM(S): at 22:54

## 2017-10-31 RX ADMIN — Medication 100 MILLIGRAM(S): at 21:52

## 2017-10-31 RX ADMIN — Medication 300 MILLIGRAM(S): at 15:24

## 2017-10-31 RX ADMIN — HEPARIN SODIUM 5000 UNIT(S): 5000 INJECTION INTRAVENOUS; SUBCUTANEOUS at 21:52

## 2017-10-31 RX ADMIN — HEPARIN SODIUM 5000 UNIT(S): 5000 INJECTION INTRAVENOUS; SUBCUTANEOUS at 13:03

## 2017-10-31 RX ADMIN — Medication 100 MILLIGRAM(S): at 04:38

## 2017-10-31 RX ADMIN — Medication 1: at 08:25

## 2017-10-31 RX ADMIN — CEFEPIME 100 MILLIGRAM(S): 1 INJECTION, POWDER, FOR SOLUTION INTRAMUSCULAR; INTRAVENOUS at 08:29

## 2017-10-31 RX ADMIN — Medication 3 MILLILITER(S): at 06:48

## 2017-10-31 NOTE — PROGRESS NOTE ADULT - ASSESSMENT
42 y F PMh DM, nephrolithiasis s/p left ureteroscopy and lithotripsy of stone with ureteral stent replacement 10/26, h/o Staph aureus in urine 9/2017 treated with bactrim, presenting with high grade fever, chills, dysuria and flank pain x 1 day. Admitted for urosepsis  She has remained febrile with high fevers despite broad spectrum antibiotics. A follow up CT scan shows pyelonephritis but no abscess and no hydronephrosis    Unclear at this point if fevers are still related to the pyelonephritis or antibiotics or another focus of infection but all cultures since admission are NGTD and respiratory sxs. are progressing. WBC stable at slightly elevated    Discussed with Urology PA-    1. Would discontinue all antibiotics and observe    2. check TTE to assess for CHF as well as valves    3. Try diuresing with lasix as she is more SOB lying flat and has received a lot of IV fluids- monitor Is and Os    4. check diff with next CBC 42 y F PMh DM, nephrolithiasis s/p left ureteroscopy and lithotripsy of stone with ureteral stent replacement 10/26, h/o Staph aureus in urine 9/2017 treated with bactrim, presenting with high grade fever, chills, dysuria and flank pain x 1 day. Admitted for urosepsis  She has remained febrile with high fevers despite broad spectrum antibiotics. A follow up CT scan shows pyelonephritis but no abscess and no hydronephrosis    Unclear at this point if fevers are still related to the pyelonephritis or antibiotics or another focus of infection but all cultures since admission are NGTD and respiratory sxs. are progressing. WBC stable at slightly elevated    Discussed with Urology PA-    1. Would discontinue all antibiotics and observe    2. check TTE to assess for CHF as well as valves    3. Try diuresing with lasix as she is more SOB lying flat and has received a lot of IV fluids- monitor Is and Os    4. check diff with next CBC    5. Agree with Urine legionella ag.   Can also send sputum for legionella culture if she is able to produce a specimen

## 2017-10-31 NOTE — CONSULT NOTE ADULT - PROBLEM SELECTOR RECOMMENDATION 3
-fever, tachycardia, leukocytosis, with either urinary vs. lung source vs endocarditis vs. drug fever  -c/w above workup

## 2017-10-31 NOTE — PROGRESS NOTE ADULT - ASSESSMENT
42F with fevers s/p URS, concern for pyelonephritis. Now with desaturation, CTPA negative for PE but concerning for PNA.     - labs  - f/u cultures  - cont Abx  - f/u official CT  - Out of bed, pain control, incentive spirometry, DVT prophylaxis   - wean 02 as tolerated

## 2017-10-31 NOTE — CONSULT NOTE ADULT - PROBLEM SELECTOR RECOMMENDATION 9
-Extensive infectious workup reviewed as above  -Infectious disease input appreciated  -Pt currently monitored off abx; pt has still spiked fever off of abx for roughly 5-6 hours; would continue to monitor off abx for now but given alternate possibility of inflammatory/infectious etiology in lungs accounting for dyspnea and persistent fevers, would suggest resuming broad spec abx if persistently febrile  until further workup can be done; would defer to ID evaluation off abx prior to resuming abx in am  -f/u repeat set of bcx; d/w RN to send repeat bcx when actively febrile to increase yield, though possibility of persistently negative cx given prior abx  -Agree with TTE to evaluate for vegetation accounting for spiking fevers; again despite negative blood cultures would need to r/o endocarditis as pt has had many abx and possiblity of culture negative endocarditis remains.  -Agree with checking diff on next cbc  -Could not definitively palpate lymph nodes on exam; would attempt to clarify prior FNA procedure and if persistently febrile, may require additional imaging such as indium scan vs. CT scan of neck to look for infection or lymphadenopathy.   -Would discuss with ID utility of AFB vs quant gold; hiv noted to be negative.

## 2017-10-31 NOTE — CONSULT NOTE ADULT - ASSESSMENT
42 y F PMh DM, nephrolithiasis s/p left ureteroscopy and lithotripsy of stone with ureteral stent replacement 10/26, h/o Staph aureus in urine 9/2017 treated with bactrim, presenting with high grade fever, chills, dysuria and flank pain x 1 day. Admitted for urosepsis  Tmax 103 F, pain now controlled  Would stop Ertapenem and start Zsoyn 3.375 iv q8h, vancomycin 1.5 mg q12h. Check vancomycin trough prior to 4th dose  Follow blood and urine cultures  If blood cx are +, would check TTE
42y Female hx of nephrolithiasis, T2DM, obesity with abdominoplasty/incisional hernia in setting of prior C sections,s/p left ureteroscopy and laser of stone with ureteral stent placement 10/26, was then admitted on this hospitalization on 10/27 with fevers and urosepsis, now with persistent spiking fevers despite negative cultures with interval CTA chest showing CESAR/LLL opacities suggestive of inflammatory vs infectious process.

## 2017-10-31 NOTE — PROGRESS NOTE ADULT - SUBJECTIVE AND OBJECTIVE BOX
INFECTIOUS DISEASES FOLLOW UP-- Amarilys Blue  [phone]    This is a follow up note for this  42yFemale with history of stent placement and admission post op for fevers, thoguht to be sepsis related but with negative blood cultures x many sets since admission. she continues to have spiking fevers and last night with some respiratory complaints and now on nasal 02  Sepsis      ROS:  CONSTITUTIONAL:   fevers, appetite poor  CARDIOVASCULAR:  No chest pain or palpitations  RESPIRATORY:  SOB while lying flat  GASTROINTESTINAL:  No nausea, vomiting, diarrhea, or abdominal pain  GENITOURINARY:  No dysuria  NEUROLOGIC:  No headache,     Allergies    mc(bitter melon)  okra  papaya (Urticaria)  many vegitables, and fruites (Urticaria)  No Known Drug Allergies    Intolerances        ANTIBIOTICS/RELEVANT:  antimicrobials  cefepime  IVPB 2000 milliGRAM(s) IV Intermittent every 12 hours  vancomycin  IVPB 1500 milliGRAM(s) IV Intermittent every 8 hours    immunologic:  influenza   Vaccine 0.5 milliLiter(s) IntraMuscular once    OTHER:  acetaminophen   Tablet 650 milliGRAM(s) Oral every 6 hours PRN  dextrose 5%. 1000 milliLiter(s) IV Continuous <Continuous>  dextrose 50% Injectable 12.5 Gram(s) IV Push once  dextrose 50% Injectable 25 Gram(s) IV Push once  dextrose 50% Injectable 25 Gram(s) IV Push once  dextrose Gel 1 Dose(s) Oral once PRN  docusate sodium 100 milliGRAM(s) Oral three times a day  glucagon  Injectable 1 milliGRAM(s) IntraMuscular once PRN  heparin  Injectable 5000 Unit(s) SubCutaneous every 8 hours  ibuprofen  Tablet 800 milliGRAM(s) Oral every 6 hours PRN  insulin lispro (HumaLOG) corrective regimen sliding scale   SubCutaneous three times a day before meals  insulin lispro (HumaLOG) corrective regimen sliding scale   SubCutaneous at bedtime  ondansetron Injectable 4 milliGRAM(s) IV Push every 8 hours PRN  oxyCODONE    5 mG/acetaminophen 325 mG 1 Tablet(s) Oral every 4 hours PRN  senna 2 Tablet(s) Oral at bedtime PRN  tamsulosin 0.4 milliGRAM(s) Oral at bedtime      Objective:  Vital Signs Last 24 Hrs  T(C): 38.2 (31 Oct 2017 13:22), Max: 39.4 (30 Oct 2017 22:01)  T(F): 100.7 (31 Oct 2017 13:22), Max: 102.9 (30 Oct 2017 22:01)  HR: 83 (31 Oct 2017 13:22) (75 - 104)  BP: 110/70 (31 Oct 2017 13:22) (110/53 - 152/84)  BP(mean): --  RR: 18 (31 Oct 2017 13:22) (18 - 19)  SpO2: 97% (31 Oct 2017 13:22) (93% - 97%)    PHYSICAL EXAM:  Constitutional:no acute distress  Eyes:ALDAIR, EOMI  Ear/Nose/Throat: no oral lesions, 	  Respiratory: clear BL  Cardiovascular: S1S2  Gastrointestinal:soft, (+) BS, no tenderness  Extremities:no e/e/c  No Lymphadenopathy  IV sites not inflammed.    LABS:                        9.9    12.3  )-----------( 311      ( 31 Oct 2017 07:19 )             31.6     10-30    138  |  101  |  6<L>  ----------------------------<  165<H>  3.6   |  21<L>  |  0.66    Ca    8.6      30 Oct 2017 22:20            MICROBIOLOGY:            RECENT CULTURES:  10-29 @ 08:57  .Urine Clean Catch (Midstream)  --  --  --    No growth  --  10-29 @ 05:56  .Blood Blood  --  --  --    No growth to date.  --  10-27 @ 22:28  .Urine Clean Catch (Midstream)  --  --  --    No growth  --  10-27 @ 21:24  .Blood Blood-Peripheral  --  --  --    No growth to date.  --      RADIOLOGY & ADDITIONAL STUDIES:    < from: CT Angio Chest w/ IV Cont (10.30.17 @ 23:48) >  Limited evaluation for segmental and subsegmental pulmonary embolism. No   main, left, right, or lobar pulmonary embolism.    Scattered patchy consolidation left upper and lower lobe with scattered   nodularity, predominantly tree-in-bud may be infectious/inflammatory.   Follow-up to complete resolution recommended.    < end of copied text >

## 2017-10-31 NOTE — CONSULT NOTE ADULT - PROBLEM SELECTOR RECOMMENDATION 2
-Etiology likely multifactorial; pt herself notes dyspnea only occurs when febrile and may be a component of tachycardia during febrile episodes  -However, pt has increasing O2 requirements and is 94% on RA which is not her baseline; she also has demonstrated opacities in CESAR/LLL with a cough, wbc, and fevers; would suggest restarting vanco/zosyn for full 7 day course if persistently fever but would defer to ID evaluation  -It was noted by prior teams that pt has sob when laying flat; on my exam she is able to tolerate laying completely flat without dyspnea.  Additionally, no significant LE edema was seen, no JVD, no pulm edema or pleffs on lung imaging. Would hold off on lasix as pt is likely intravascularly dry from persistent fevers/diaphoresis; can check pro-bnp with next set of labs and f/u TTE as above to assess systolic function in addition to valves.

## 2017-10-31 NOTE — CONSULT NOTE ADULT - SUBJECTIVE AND OBJECTIVE BOX
42y Female hx of nephrolithiasis, T2DM, obesity with abdominoplasty/incisional hernia in setting of prior C sections,s/p left ureteroscopy and laser of stone with ureteral stent placement 10/26, was then admitted on this hospitalization on 10/27 with fevers and urosepsis.  Prior UTI with MSSA in 2017 tx with bactrim. Here was treated for presumed urosepsis (+UA with negative ucx) initially with CTX, then switched to ertapenem; ID consult was called and pt initially switched to vanco/zosyn 10/28-10/30, then switched to vanco/cefepime 10/31, and eventually with continually negative bcx/ucx, observed off all abx in setting of persistent fever since 10/31 at around 7 pm.  During the last few days, pt has had persistent spiking fevers with development of some respiratory symptoms; there was concern for developing pna vs. possible abscess of urinary system.  CTa/p on 10/29 showed L ureteral stent, absence of prior seen stone, L pyelo, but NO abscess. CTA chest obtained was limited, did not show definitive PE, but did note scattered patchy consolidation of CESAR/LLL with tree in bud findings suggestive of pna.   Currently___    REVIEW OF SYSTEMS:  CONSTITUTIONAL: No weakness. No fevers. No chills. No weight loss. Good appetite.  EYES: No visual changes. No eye pain.  ENT: No hearing difficulty. No vertigo. No dysphagia. No Sinusitis/rhinorrhea.  NECK: No pain. No stiffness/rigidity.  CARDIAC: No chest pain. No palpitations.  RESPIRATORY: No cough. No SOB. No hemoptysis.  GASTROINTESTINAL: No abdominal pain. No nausea. No vomiting. No hematemesis. No diarrhea. No constipation. No melena. No hematochezia.  GENITOURINARY: No dysuria. No frequency. No hesitancy. No hematuria.  NEUROLOGICAL: No numbness. No focal weakness. No incontinence. No headache.  BACK: No back pain.  EXTREMITIES: No lower extremity edema. Full ROM.  SKIN: No rashes. No itching. No other lesions.  PSYCHIATRIC: No depression. No anxiety. No SI/HI.  ALLERGIC: No lip swelling. No hives.  All other review of systems is negative unless indicated above.    PAST MEDICAL & SURGICAL HISTORY:  Thyroid nodule: s/p biopsy 2015  Hematuria  Genital sore: recurrent w/Menstrual period, uses PRN Valcyclovir.  Type 2 diabetes mellitus without complication, unspecified long term insulin use status  Hernia: umbilical  Kidney stones  Morbid obesity due to excess calories  Chronic anemia  Incisional hernia  Vitamin B12 deficiency  DM (diabetes mellitus)  History of hernia repair: x 3  H/O: : x3  H/O abdominoplasty: 2015  History of incisional hernia repair: x 3  History of : x 3 (, , )    FAMILY HISTORY:  Family history of diabetes mellitus (DM) (Sibling)    Social History:    Marital Status:  (   )    (   ) Single    (   )    (  )   Occupation:   Lives with: (  ) alone  (  ) children   (  ) spouse   (  ) parents  (  ) other    Substance Use (street drugs): (  ) never used  (  ) other:  Tobacco Usage:  (   ) never smoked   (   ) former smoker   (   ) current smoker  (     ) pack year  (        ) last cigarette date  Alcohol Usage:  Sexual History:     MEDICATIONS  (STANDING):  dextrose 5%. 1000 milliLiter(s) (50 mL/Hr) IV Continuous <Continuous>  dextrose 50% Injectable 12.5 Gram(s) IV Push once  dextrose 50% Injectable 25 Gram(s) IV Push once  dextrose 50% Injectable 25 Gram(s) IV Push once  docusate sodium 100 milliGRAM(s) Oral three times a day  heparin  Injectable 5000 Unit(s) SubCutaneous every 8 hours  influenza   Vaccine 0.5 milliLiter(s) IntraMuscular once  insulin lispro (HumaLOG) corrective regimen sliding scale   SubCutaneous three times a day before meals  insulin lispro (HumaLOG) corrective regimen sliding scale   SubCutaneous at bedtime  tamsulosin 0.4 milliGRAM(s) Oral at bedtime    MEDICATIONS  (PRN):  acetaminophen   Tablet 650 milliGRAM(s) Oral every 6 hours PRN For Temp greater than 38.5 C (101.3 F)  dextrose Gel 1 Dose(s) Oral once PRN Blood Glucose LESS THAN 70 milliGRAM(s)/deciliter  glucagon  Injectable 1 milliGRAM(s) IntraMuscular once PRN Glucose LESS THAN 70 milligrams/deciliter  ibuprofen  Tablet 800 milliGRAM(s) Oral every 6 hours PRN fever  ondansetron Injectable 4 milliGRAM(s) IV Push every 8 hours PRN Nausea and/or Vomiting  oxyCODONE    5 mG/acetaminophen 325 mG 1 Tablet(s) Oral every 4 hours PRN Moderate Pain  senna 2 Tablet(s) Oral at bedtime PRN Constipation    Allergies    mc(bitter melon)  okra  papaya (Urticaria)  many vegitables, and fruites (Urticaria)  No Known Drug Allergies    Intolerances      Vital Signs Last 24 Hrs  T(C): 38.6 (31 Oct 2017 22:55), Max: 39.2 (31 Oct 2017 06:30)  T(F): 101.5 (31 Oct 2017 22:55), Max: 102.6 (31 Oct 2017 06:30)  HR: 82 (31 Oct 2017 21:04) (61 - 99)  BP: 139/84 (31 Oct 2017 21:04) (108/72 - 148/83)  BP(mean): --  RR: 18 (31 Oct 2017 21:04) (18 - 19)  SpO2: 95% (31 Oct 2017 21:04) (93% - 98%)    PHYSICAL EXAM:  GENERAL: NAD, well-groomed, well-developed  HEAD:  Atraumatic, Normocephalic  EYES: EOMI, PERRLA, conjunctiva and sclera clear  ENMT: No tonsillar erythema, exudates, or enlargement; Moist mucous membranes, Good dentition, No lesions  NECK: Supple, No JVD, Normal thyroid  CHEST/LUNG: Clear to percussion bilaterally; No rales, rhonchi, wheezing, or rubs  HEART: Regular rate and rhythm; No murmurs, rubs, or gallops  ABDOMEN: Soft, Nontender, Nondistended; Bowel sounds present  EXTREMITIES:  2+ Peripheral Pulses, No clubbing, cyanosis, or edema  LYMPH: No lymphadenopathy noted  SKIN: No rashes or lesions  NERVOUS SYSTEM:  Alert & Oriented X3, Good concentration; Motor Strength 5/5 B/L upper and lower extremities; DTRs 2+ intact and symmetric    10-30    138  |  101  |  6<L>  ----------------------------<  165<H>  3.6   |  21<L>  |  0.66  10-29    136  |  103  |  6<L>  ----------------------------<  123<H>  4.5   |  17<L>  |  0.65    Ca    8.6      30 Oct 2017 22:20  Ca    9.0      29 Oct 2017 09:38                          9.9    12.3  )-----------( 311      ( 31 Oct 2017 07:19 )             31.6                         9.6    12.6  )-----------( 285      ( 30 Oct 2017 22:20 )             29.5                         10.0   13.1  )-----------( 272      ( 30 Oct 2017 09:27 )             30.8     CAPILLARY BLOOD GLUCOSE      POCT Blood Glucose.: 145 mg/dL (31 Oct 2017 21:48)  POCT Blood Glucose.: 124 mg/dL (31 Oct 2017 16:12)  POCT Blood Glucose.: 154 mg/dL (31 Oct 2017 11:33)  POCT Blood Glucose.: 200 mg/dL (31 Oct 2017 07:56)      Culture - Blood (collected 30 Oct 2017 15:13)  Source: .Blood Blood  Preliminary Report (31 Oct 2017 16:00):    No growth to date.    Culture - Urine (collected 29 Oct 2017 08:57)  Source: .Urine Clean Catch (Midstream)  Final Report (30 Oct 2017 08:24):    No growth    Culture - Blood (collected 29 Oct 2017 05:56)  Source: .Blood Blood  Preliminary Report (30 Oct 2017 06:01):    No growth to date.    Culture - Blood (collected 29 Oct 2017 05:56)  Source: .Blood Blood  Preliminary Report (30 Oct 2017 06:01):    No growth to date.    HIV-1/2 Antigen/Antibody Screen by [facility] (10.31.17 @ 16:23)    HIV-1/2 Combo Result: Nonreact: The HIV Ag/Ab Combo test performed screens for HIV-1 p24 antigen,  antibodies to HIV-1 (group M and group O), and antibodies to HIV-2. All  specimens repeatedly reactive will reflex to an HIV 1/2 antibody  confirmation and differentiation test. This assay detects p24 antigen  which may be present prior to the development of HIV antibodies,  therefore a reactive result with a negative HIV 1/2 AB Confirmation  should be followed up with HIV-1 RNA, HIV-2 RNA and repeat testing in 4-8  weeks. A nonreactive result does not preclude previous exposure to or  infection with HIV-1 or HIV-2. [facility] prohibits disclosure of this  result to any unauthorized party.    Rapid Respiratory Viral Panel (10.31.17 @ 00:14)    Rapid RVP Result: NotDete: The FilmArray RVP Rapid uses polymerase chain reaction (PCR) and melt  curve analysis to screen for adenovirus; coronavirus HKU1, NL63, 229E,  OC43; human metapneumovirus (hMPV); human enterovirus/rhinovirus  (Entero/RV); influenza A; influenza A/H1;influenza A/H3; influenza  A/H1-2009; influenza B; parainfluenza viruses 1, 2, 3, 4; respiratory  syncytial virus; Bordetella pertussis; Mycoplasma pneumoniae; and  Chlamydophila pneumoniae.      CTA cheset 10/30:   IMPRESSION:   Limited evaluation for segmental and subsegmental pulmonary embolism. No   main, left, right, or lobar pulmonary embolism.      CTa/p 10/29:   IMPRESSION: Interval placement of left ureteral stent. Left proximal   ureteral stone is no longer present. Striated left nephrogram consistent   with pyelonephritis. No abscess.          Scattered patchy consolidation left upper and lower lobe with scattered   nodularity, predominantly tree-in-bud may be infectious/inflammatory.   Follow-up to complete resolution recommended. 42y Female hx of nephrolithiasis, T2DM, obesity with abdominoplasty/incisional hernia in setting of prior C sections,s/p left ureteroscopy and laser of stone with ureteral stent placement 10/26, was then admitted on this hospitalization on 10/27 with fevers and urosepsis.  Prior UTI with MSSA in 2017 tx with bactrim. Here was treated for presumed urosepsis (+UA with negative ucx) initially with CTX, then switched to ertapenem; ID consult was called and pt initially switched to vanco/zosyn 10/28-10/30, then switched to vanco/cefepime 10/31, and eventually with continually negative bcx/ucx, observed off all abx in setting of persistent fever since 10/31 at around 7 pm.  During the last few days, pt has had persistent spiking fevers with development of some respiratory symptoms; there was concern for developing pna vs. possible abscess of urinary system.  CTa/p on 10/29 showed L ureteral stent, absence of prior seen stone, L pyelo, but NO abscess. CTA chest obtained was limited, did not show definitive PE, but did note scattered patchy consolidation of CESAR/LLL with tree in bud findings suggestive of pna.   Currently, pt requiring more 2L NC all day, with +dry cough but no overt dyspnea at rest. O2 sat on RA 94%.  Denies CP.  Still with ongoing spiking fevers off abx, most recently 1 hour ago with associated diaphoresis; pt states dyspnea only present when febrile. +n, no vomiting, no diarrhea, no rashes, no lines/ports, no wounds. Further hx clarified.  Born in [facility], living here for 25 yrs, last visited 7 yrs ago; no prior TB hx or hx of hemoptysis. on ROS, + weight loss of 10 pounds in last few months attributed to diarrhea associated with metformin. +night sweats PRIOR to urologic procedure.  +hx of cervical lymphadenopathy with FNA at OSH that was reportedly negative.     REVIEW OF SYSTEMS:  CONSTITUTIONAL: No weakness. No fevers. No chills. No weight loss. Good appetite.  EYES: No visual changes. No eye pain.  ENT: No hearing difficulty. No vertigo. No dysphagia. No Sinusitis/rhinorrhea.  NECK: No pain. No stiffness/rigidity.  CARDIAC: No chest pain. No palpitations.  RESPIRATORY: No cough. No SOB. No hemoptysis.  GASTROINTESTINAL: No abdominal pain. No nausea. No vomiting. No hematemesis. No diarrhea. No constipation. No melena. No hematochezia.  GENITOURINARY: No dysuria. No frequency. No hesitancy. No hematuria.  NEUROLOGICAL: No numbness. No focal weakness. No incontinence. No headache.  BACK: No back pain.  EXTREMITIES: No lower extremity edema. Full ROM.  SKIN: No rashes. No itching. No other lesions.  PSYCHIATRIC: No depression. No anxiety. No SI/HI.  ALLERGIC: No lip swelling. No hives.  All other review of systems is negative unless indicated above.    PAST MEDICAL & SURGICAL HISTORY:  Thyroid nodule: s/p biopsy   Hematuria  Genital sore: recurrent w/Menstrual period, uses PRN Valcyclovir.  Type 2 diabetes mellitus without complication, unspecified long term insulin use status  Hernia: umbilical  Kidney stones  Morbid obesity due to excess calories  Chronic anemia  Incisional hernia  Vitamin B12 deficiency  DM (diabetes mellitus)  History of hernia repair: x 3  H/O: : x3  H/O abdominoplasty: 2015  History of incisional hernia repair: x 3  History of : x 3 (, , )    FAMILY HISTORY:  Family history of diabetes mellitus (DM) (Sibling)    Social History:    Marital Status:  (   )    (   ) Single    (   )    (  )   Occupation:   Lives with: (  ) alone  (  ) children   (  ) spouse   (  ) parents  (  ) other    Substance Use (street drugs): (  ) never used  (  ) other:  Tobacco Usage:  (   ) never smoked   (   ) former smoker   (   ) current smoker  (     ) pack year  (        ) last cigarette date  Alcohol Usage:  Sexual History:     MEDICATIONS  (STANDING):  dextrose 5%. 1000 milliLiter(s) (50 mL/Hr) IV Continuous <Continuous>  dextrose 50% Injectable 12.5 Gram(s) IV Push once  dextrose 50% Injectable 25 Gram(s) IV Push once  dextrose 50% Injectable 25 Gram(s) IV Push once  docusate sodium 100 milliGRAM(s) Oral three times a day  heparin  Injectable 5000 Unit(s) SubCutaneous every 8 hours  influenza   Vaccine 0.5 milliLiter(s) IntraMuscular once  insulin lispro (HumaLOG) corrective regimen sliding scale   SubCutaneous three times a day before meals  insulin lispro (HumaLOG) corrective regimen sliding scale   SubCutaneous at bedtime  tamsulosin 0.4 milliGRAM(s) Oral at bedtime    MEDICATIONS  (PRN):  acetaminophen   Tablet 650 milliGRAM(s) Oral every 6 hours PRN For Temp greater than 38.5 C (101.3 F)  dextrose Gel 1 Dose(s) Oral once PRN Blood Glucose LESS THAN 70 milliGRAM(s)/deciliter  glucagon  Injectable 1 milliGRAM(s) IntraMuscular once PRN Glucose LESS THAN 70 milligrams/deciliter  ibuprofen  Tablet 800 milliGRAM(s) Oral every 6 hours PRN fever  ondansetron Injectable 4 milliGRAM(s) IV Push every 8 hours PRN Nausea and/or Vomiting  oxyCODONE    5 mG/acetaminophen 325 mG 1 Tablet(s) Oral every 4 hours PRN Moderate Pain  senna 2 Tablet(s) Oral at bedtime PRN Constipation    Allergies    mc(bitter melon)  okra  papaya (Urticaria)  many vegitables, and fruites (Urticaria)  No Known Drug Allergies    Intolerances      Vital Signs Last 24 Hrs  T(C): 38.6 (31 Oct 2017 22:55), Max: 39.2 (31 Oct 2017 06:30)  T(F): 101.5 (31 Oct 2017 22:55), Max: 102.6 (31 Oct 2017 06:30)  HR: 82 (31 Oct 2017 21:04) (61 - 99)  BP: 139/84 (31 Oct 2017 21:04) (108/72 - 148/83)  BP(mean): --  RR: 18 (31 Oct 2017 21:04) (18 - 19)  SpO2: 95% (31 Oct 2017 21:04) (93% - 98%)    PHYSICAL EXAM:  GENERAL: NAD, well-groomed, well-developed  HEAD:  Atraumatic, Normocephalic  EYES: EOMI, PERRLA, conjunctiva and sclera clear  ENMT: No tonsillar erythema, exudates, or enlargement; Moist mucous membranes, Good dentition, No lesions  NECK: Supple, No JVD, Normal thyroid  CHEST/LUNG: Clear to percussion bilaterally; No rales, rhonchi, wheezing, or rubs  HEART: Regular rate and rhythm; No murmurs, rubs, or gallops  ABDOMEN: Soft, Nontender, Nondistended; Bowel sounds present  EXTREMITIES:  2+ Peripheral Pulses, No clubbing, cyanosis, or edema  LYMPH: No lymphadenopathy noted  SKIN: No rashes or lesions  NERVOUS SYSTEM:  Alert & Oriented X3, Good concentration; Motor Strength 5/5 B/L upper and lower extremities; DTRs 2+ intact and symmetric    10-30    138  |  101  |  6<L>  ----------------------------<  165<H>  3.6   |  21<L>  |  0.66  10-29    136  |  103  |  6<L>  ----------------------------<  123<H>  4.5   |  17<L>  |  0.65    Ca    8.6      30 Oct 2017 22:20  Ca    9.0      29 Oct 2017 09:38                          9.9    12.3  )-----------( 311      ( 31 Oct 2017 07:19 )             31.6                         9.6    12.6  )-----------( 285      ( 30 Oct 2017 22:20 )             29.5                         10.0   13.1  )-----------( 272      ( 30 Oct 2017 09:27 )             30.8     CAPILLARY BLOOD GLUCOSE      POCT Blood Glucose.: 145 mg/dL (31 Oct 2017 21:48)  POCT Blood Glucose.: 124 mg/dL (31 Oct 2017 16:12)  POCT Blood Glucose.: 154 mg/dL (31 Oct 2017 11:33)  POCT Blood Glucose.: 200 mg/dL (31 Oct 2017 07:56)      Culture - Blood (collected 30 Oct 2017 15:13)  Source: .Blood Blood  Preliminary Report (31 Oct 2017 16:00):    No growth to date.    Culture - Urine (collected 29 Oct 2017 08:57)  Source: .Urine Clean Catch (Midstream)  Final Report (30 Oct 2017 08:24):    No growth    Culture - Blood (collected 29 Oct 2017 05:56)  Source: .Blood Blood  Preliminary Report (30 Oct 2017 06:01):    No growth to date.    Culture - Blood (collected 29 Oct 2017 05:56)  Source: .Blood Blood  Preliminary Report (30 Oct 2017 06:01):    No growth to date.    HIV-1/2 Antigen/Antibody Screen by [facility]IA (10.31.17 @ 16:23)    HIV-1/2 Combo Result: Nonreact: The HIV Ag/Ab Combo test performed screens for HIV-1 p24 antigen,  antibodies to HIV-1 (group M and group O), and antibodies to HIV-2. All  specimens repeatedly reactive will reflex to an HIV 1/2 antibody  confirmation and differentiation test. This assay detects p24 antigen  which may be present prior to the development of HIV antibodies,  therefore a reactive result with a negative HIV 1/2 AB Confirmation  should be followed up with HIV-1 RNA, HIV-2 RNA and repeat testing in 4-8  weeks. A nonreactive result does not preclude previous exposure to or  infection with HIV-1 or HIV-2. [facility] prohibits disclosure of this  result to any unauthorized party.    Rapid Respiratory Viral Panel (10.31.17 @ 00:14)    Rapid RVP Result: NotDetec: The FilmArray RVP Rapid uses polymerase chain reaction (PCR) and melt  curve analysis to screen for adenovirus; coronavirus HKU1, NL63, 229E,  OC43; human metapneumovirus (hMPV); human enterovirus/rhinovirus  (Entero/RV); influenza A; influenza A/H1;influenza A/H3; influenza  A/H1-2009; influenza B; parainfluenza viruses 1, 2, 3, 4; respiratory  syncytial virus; Bordetella pertussis; Mycoplasma pneumoniae; and  Chlamydophila pneumoniae.      CTA cheset 10/30:   IMPRESSION:   Limited evaluation for segmental and subsegmental pulmonary embolism. No   main, left, right, or lobar pulmonary embolism.      CTa/p 10/29:   IMPRESSION: Interval placement of left ureteral stent. Left proximal   ureteral stone is no longer present. Striated left nephrogram consistent   with pyelonephritis. No abscess.          Scattered patchy consolidation left upper and lower lobe with scattered   nodularity, predominantly tree-in-bud may be infectious/inflammatory.   Follow-up to complete resolution recommended. 42y Female hx of nephrolithiasis, T2DM, obesity with abdominoplasty/incisional hernia in setting of prior C sections,s/p left ureteroscopy and laser of stone with ureteral stent placement 10/26, was then admitted on this hospitalization on 10/27 with fevers and urosepsis.  Prior UTI with MSSA in 2017 tx with bactrim. Here was treated for presumed urosepsis (+UA with negative ucx) initially with CTX, then switched to ertapenem; ID consult was called and pt initially switched to vanco/zosyn 10/28-10/30, then switched to vanco/cefepime 10/31, and eventually with continually negative bcx/ucx, observed off all abx in setting of persistent fever since 10/31 at around 7 pm.  During the last few days, pt has had persistent spiking fevers with development of some respiratory symptoms; there was concern for developing pna vs. possible abscess of urinary system.  CTa/p on 10/29 showed L ureteral stent, absence of prior seen stone, L pyelo, but NO abscess. CTA chest obtained was limited, did not show definitive PE, but did note scattered patchy consolidation of CESAR/LLL with tree in bud findings suggestive of pna.   Currently, pt requiring more 2L NC all day, with +dry cough but no overt dyspnea at rest. O2 sat on RA 94%.  Denies CP.  Still with ongoing spiking fevers off abx, most recently 1 hour ago with associated diaphoresis; pt states dyspnea only present when febrile. +n, no vomiting, no diarrhea, no rashes, no lines/ports, no wounds. Further hx clarified.  Born in [facility], living here for 25 yrs, last visited 7 yrs ago; no prior TB hx or hx of hemoptysis. on ROS, + weight loss of 10 pounds in last few months attributed to diarrhea associated with metformin. +night sweats PRIOR to urologic procedure.  +hx of cervical lymphadenopathy with FNA at OSH that was reportedly negative.     REVIEW OF SYSTEMS:  CONSTITUTIONAL: +weakness. +fevers. +chills. + 10 lb weight loss. dec appetite.  EYES: No visual changes. No eye pain.  ENT: No hearing difficulty. No vertigo. No dysphagia. No Sinusitis/rhinorrhea.  NECK: No pain. No stiffness/rigidity.  CARDIAC: No chest pain. No palpitations.  RESPIRATORY: +dry cough. +SOB while febrile only. No hemoptysis.  GASTROINTESTINAL: No abdominal pain. +nausea. No vomiting. No hematemesis. No diarrhea. No constipation. No melena. No hematochezia.  GENITOURINARY: +dysuria. No frequency. No hesitancy. No hematuria.  NEUROLOGICAL: No numbness. No focal weakness. No incontinence. No headache.  BACK: +back pain.  EXTREMITIES: No lower extremity edema. Full ROM.  SKIN: No rashes. No itching. No other lesions.  PSYCHIATRIC: No depression. No anxiety. No SI/HI.  ALLERGIC: No lip swelling. No hives.  All other review of systems is negative unless indicated above.    PAST MEDICAL & SURGICAL HISTORY:  Thyroid nodule: s/p biopsy   Hematuria  Genital sore: recurrent w/Menstrual period, uses PRN Valcyclovir.  Type 2 diabetes mellitus without complication, unspecified long term insulin use status  Hernia: umbilical  Kidney stones  Morbid obesity due to excess calories  Chronic anemia  Incisional hernia  Vitamin B12 deficiency  DM (diabetes mellitus)  History of hernia repair: x 3  H/O: : x3  H/O abdominoplasty: 2015  History of incisional hernia repair: x 3  History of : x 3 (, , )    FAMILY HISTORY:  Family history of diabetes mellitus (DM) (Sibling)    Social History:    Marital Status:  ( x  )    (   ) Single    (   )    (  )     Lives with: (  ) alone  (  ) children   (x  ) spouse   (  ) parents  (  ) other    Substance Use (street drugs): ( x ) never used  (  ) other:  Tobacco Usage:  ( x  ) never smoked   (   ) former smoker   (   ) current smoker  (     ) pack year  (        ) last cigarette date  Alcohol Usage: denies      MEDICATIONS  (STANDING):  dextrose 5%. 1000 milliLiter(s) (50 mL/Hr) IV Continuous <Continuous>  dextrose 50% Injectable 12.5 Gram(s) IV Push once  dextrose 50% Injectable 25 Gram(s) IV Push once  dextrose 50% Injectable 25 Gram(s) IV Push once  docusate sodium 100 milliGRAM(s) Oral three times a day  heparin  Injectable 5000 Unit(s) SubCutaneous every 8 hours  influenza   Vaccine 0.5 milliLiter(s) IntraMuscular once  insulin lispro (HumaLOG) corrective regimen sliding scale   SubCutaneous three times a day before meals  insulin lispro (HumaLOG) corrective regimen sliding scale   SubCutaneous at bedtime  tamsulosin 0.4 milliGRAM(s) Oral at bedtime    MEDICATIONS  (PRN):  acetaminophen   Tablet 650 milliGRAM(s) Oral every 6 hours PRN For Temp greater than 38.5 C (101.3 F)  dextrose Gel 1 Dose(s) Oral once PRN Blood Glucose LESS THAN 70 milliGRAM(s)/deciliter  glucagon  Injectable 1 milliGRAM(s) IntraMuscular once PRN Glucose LESS THAN 70 milligrams/deciliter  ibuprofen  Tablet 800 milliGRAM(s) Oral every 6 hours PRN fever  ondansetron Injectable 4 milliGRAM(s) IV Push every 8 hours PRN Nausea and/or Vomiting  oxyCODONE    5 mG/acetaminophen 325 mG 1 Tablet(s) Oral every 4 hours PRN Moderate Pain  senna 2 Tablet(s) Oral at bedtime PRN Constipation    Allergies    mc(bitter melon)  okra  papaya (Urticaria)  many vegitables, and fruites (Urticaria)  No Known Drug Allergies    Intolerances      Vital Signs Last 24 Hrs  T(C): 38.6 (31 Oct 2017 22:55), Max: 39.2 (31 Oct 2017 06:30)  T(F): 101.5 (31 Oct 2017 22:55), Max: 102.6 (31 Oct 2017 06:30)  HR: 82 (31 Oct 2017 21:04) (61 - 99)  BP: 139/84 (31 Oct 2017 21:04) (108/72 - 148/83)  BP(mean): --  RR: 18 (31 Oct 2017 21:04) (18 - 19)  SpO2: 95% (31 Oct 2017 21:04) (93% - 98%)    PHYSICAL EXAM:  GENERAL: mild distress 2/2 fever, well-groomed, well-developed, diaphoretic  HEAD:  Atraumatic, Normocephalic  EYES: EOMI, PERRLA, conjunctiva and sclera clear  ENMT: No tonsillar erythema, exudates, or enlargement; Moist mucous membranes, Good dentition, No lesions  NECK: Supple, No JVD, Normal thyroid  CHEST/LUNG: Clear to percussion bilaterally; No rales, rhonchi, wheezing, or rubs.  no inc dyspnea when pt lays flat.   HEART: Regular rate and rhythm; No murmurs, rubs, or gallops, no significant LE edema  ABDOMEN: Soft, Nontender, Nondistended; Bowel sounds present  EXTREMITIES:  2+ Peripheral Pulses, No clubbing, cyanosis  LYMPH: No lymphadenopathy noted  SKIN: No rashes or lesions  NERVOUS SYSTEM:  Alert & Oriented X3, Good concentration; Motor Strength 5/5 B/L upper and lower extremities    10-30    138  |  101  |  6<L>  ----------------------------<  165<H>  3.6   |  21<L>  |  0.66  10-29    136  |  103  |  6<L>  ----------------------------<  123<H>  4.5   |  17<L>  |  0.65    Ca    8.6      30 Oct 2017 22:20  Ca    9.0      29 Oct 2017 09:38                          9.9    12.3  )-----------( 311      ( 31 Oct 2017 07:19 )             31.6                         9.6    12.6  )-----------( 285      ( 30 Oct 2017 22:20 )             29.5                         10.0   13.1  )-----------( 272      ( 30 Oct 2017 09:27 )             30.8     CAPILLARY BLOOD GLUCOSE      POCT Blood Glucose.: 145 mg/dL (31 Oct 2017 21:48)  POCT Blood Glucose.: 124 mg/dL (31 Oct 2017 16:12)  POCT Blood Glucose.: 154 mg/dL (31 Oct 2017 11:33)  POCT Blood Glucose.: 200 mg/dL (31 Oct 2017 07:56)      Culture - Blood (collected 30 Oct 2017 15:13)  Source: .Blood Blood  Preliminary Report (31 Oct 2017 16:00):    No growth to date.    Culture - Urine (collected 29 Oct 2017 08:57)  Source: .Urine Clean Catch (Midstream)  Final Report (30 Oct 2017 08:24):    No growth    Culture - Blood (collected 29 Oct 2017 05:56)  Source: .Blood Blood  Preliminary Report (30 Oct 2017 06:01):    No growth to date.    Culture - Blood (collected 29 Oct 2017 05:56)  Source: .Blood Blood  Preliminary Report (30 Oct 2017 06:01):    No growth to date.    HIV-1/2 Antigen/Antibody Screen by [facility] (10.31.17 @ 16:23)    HIV-1/2 Combo Result: Nonreact: The HIV Ag/Ab Combo test performed screens for HIV-1 p24 antigen,  antibodies to HIV-1 (group M and group O), and antibodies to HIV-2. All  specimens repeatedly reactive will reflex to an HIV 1/2 antibody  confirmation and differentiation test. This assay detects p24 antigen  which may be present prior to the development of HIV antibodies,  therefore a reactive result with a negative HIV 1/2 AB Confirmation  should be followed up with HIV-1 RNA, HIV-2 RNA and repeat testing in 4-8  weeks. A nonreactive result does not preclude previous exposure to or  infection with HIV-1 or HIV-2. [facility] prohibits disclosure of this  result to any unauthorized party.    Rapid Respiratory Viral Panel (10.31.17 @ 00:14)    Rapid RVP Result: NotDetec: The [employer] RVP Rapid uses polymerase chain reaction (PCR) and melt  curve analysis to screen for adenovirus; coronavirus HKU1, NL63, 229E,  OC43; human metapneumovirus (hMPV); human enterovirus/rhinovirus  (Entero/RV); influenza A; influenza A/H1;influenza A/H3; influenza  A/H1-2009; influenza B; parainfluenza viruses 1, 2, 3, 4; respiratory  syncytial virus; Bordetella pertussis; Mycoplasma pneumoniae; and  Chlamydophila pneumoniae.      CTA cheset 10/30:   IMPRESSION:   Limited evaluation for segmental and subsegmental pulmonary embolism. No   main, left, right, or lobar pulmonary embolism.      CTa/p 10/29:   IMPRESSION: Interval placement of left ureteral stent. Left proximal   ureteral stone is no longer present. Striated left nephrogram consistent   with pyelonephritis. No abscess.          Scattered patchy consolidation left upper and lower lobe with scattered   nodularity, predominantly tree-in-bud may be infectious/inflammatory.   Follow-up to complete resolution recommended.

## 2017-11-01 DIAGNOSIS — R06.02 SHORTNESS OF BREATH: ICD-10-CM

## 2017-11-01 DIAGNOSIS — R50.9 FEVER, UNSPECIFIED: ICD-10-CM

## 2017-11-01 DIAGNOSIS — N20.0 CALCULUS OF KIDNEY: ICD-10-CM

## 2017-11-01 DIAGNOSIS — A41.9 SEPSIS, UNSPECIFIED ORGANISM: ICD-10-CM

## 2017-11-01 DIAGNOSIS — Z29.9 ENCOUNTER FOR PROPHYLACTIC MEASURES, UNSPECIFIED: ICD-10-CM

## 2017-11-01 LAB
4/8 RATIO: 1.81 RATIO — SIGNIFICANT CHANGE UP (ref 0.9–3.6)
ABS CD8: 662 /UL — SIGNIFICANT CHANGE UP (ref 136–757)
ANION GAP SERPL CALC-SCNC: 14 MMOL/L — SIGNIFICANT CHANGE UP (ref 5–17)
BASOPHILS # BLD AUTO: 0.1 K/UL — SIGNIFICANT CHANGE UP (ref 0–0.2)
BASOPHILS NFR BLD AUTO: 0.6 % — SIGNIFICANT CHANGE UP (ref 0–2)
BUN SERPL-MCNC: 5 MG/DL — LOW (ref 7–23)
CALCIUM SERPL-MCNC: 9.1 MG/DL — SIGNIFICANT CHANGE UP (ref 8.4–10.5)
CD3 BLASTS SPEC-ACNC: 1889 /UL — SIGNIFICANT CHANGE UP (ref 799–2171)
CD3 BLASTS SPEC-ACNC: 90 % — HIGH (ref 59–85)
CD4 %: 57 % — SIGNIFICANT CHANGE UP (ref 36–65)
CD8 %: 32 % — SIGNIFICANT CHANGE UP (ref 11–36)
CHLORIDE SERPL-SCNC: 100 MMOL/L — SIGNIFICANT CHANGE UP (ref 96–108)
CO2 SERPL-SCNC: 26 MMOL/L — SIGNIFICANT CHANGE UP (ref 22–31)
CREAT SERPL-MCNC: 0.68 MG/DL — SIGNIFICANT CHANGE UP (ref 0.5–1.3)
CULTURE RESULTS: SIGNIFICANT CHANGE UP
CULTURE RESULTS: SIGNIFICANT CHANGE UP
EOSINOPHIL # BLD AUTO: 0.2 K/UL — SIGNIFICANT CHANGE UP (ref 0–0.5)
EOSINOPHIL # BLD: 170 /UL — SIGNIFICANT CHANGE UP (ref 50–350)
EOSINOPHIL NFR BLD AUTO: 1.8 % — SIGNIFICANT CHANGE UP (ref 0–6)
GLUCOSE SERPL-MCNC: 121 MG/DL — HIGH (ref 70–99)
HCT VFR BLD CALC: 29.2 % — LOW (ref 34.5–45)
HGB BLD-MCNC: 9.3 G/DL — LOW (ref 11.5–15.5)
LEGIONELLA AG UR QL: NEGATIVE — SIGNIFICANT CHANGE UP
LYMPHOCYTES # BLD AUTO: 2.9 K/UL — SIGNIFICANT CHANGE UP (ref 1–3.3)
LYMPHOCYTES # BLD AUTO: 26 % — SIGNIFICANT CHANGE UP (ref 13–44)
MCHC RBC-ENTMCNC: 26 PG — LOW (ref 27–34)
MCHC RBC-ENTMCNC: 31.9 GM/DL — LOW (ref 32–36)
MCV RBC AUTO: 81.6 FL — SIGNIFICANT CHANGE UP (ref 80–100)
MONOCYTES # BLD AUTO: 1 K/UL — HIGH (ref 0–0.9)
MONOCYTES NFR BLD AUTO: 9.2 % — SIGNIFICANT CHANGE UP (ref 2–14)
NEUTROPHILS # BLD AUTO: 6.9 K/UL — SIGNIFICANT CHANGE UP (ref 1.8–7.4)
NEUTROPHILS NFR BLD AUTO: 62.3 % — SIGNIFICANT CHANGE UP (ref 43–77)
PLATELET # BLD AUTO: 295 K/UL — SIGNIFICANT CHANGE UP (ref 150–400)
POTASSIUM SERPL-MCNC: 3.1 MMOL/L — LOW (ref 3.5–5.3)
POTASSIUM SERPL-SCNC: 3.1 MMOL/L — LOW (ref 3.5–5.3)
RBC # BLD: 3.58 M/UL — LOW (ref 3.8–5.2)
RBC # FLD: 13.2 % — SIGNIFICANT CHANGE UP (ref 10.3–14.5)
SODIUM SERPL-SCNC: 140 MMOL/L — SIGNIFICANT CHANGE UP (ref 135–145)
SPECIMEN SOURCE: SIGNIFICANT CHANGE UP
SPECIMEN SOURCE: SIGNIFICANT CHANGE UP
T-CELL CD4 SUBSET PNL BLD: 1197 /UL — SIGNIFICANT CHANGE UP (ref 489–1457)
WBC # BLD: 11.1 K/UL — HIGH (ref 3.8–10.5)
WBC # FLD AUTO: 11.1 K/UL — HIGH (ref 3.8–10.5)

## 2017-11-01 PROCEDURE — 99232 SBSQ HOSP IP/OBS MODERATE 35: CPT

## 2017-11-01 RX ORDER — POTASSIUM CHLORIDE 20 MEQ
20 PACKET (EA) ORAL
Qty: 0 | Refills: 0 | Status: COMPLETED | OUTPATIENT
Start: 2017-11-01 | End: 2017-11-01

## 2017-11-01 RX ADMIN — TAMSULOSIN HYDROCHLORIDE 0.4 MILLIGRAM(S): 0.4 CAPSULE ORAL at 22:01

## 2017-11-01 RX ADMIN — Medication 200 MILLIGRAM(S): at 11:06

## 2017-11-01 RX ADMIN — HEPARIN SODIUM 5000 UNIT(S): 5000 INJECTION INTRAVENOUS; SUBCUTANEOUS at 22:01

## 2017-11-01 RX ADMIN — HEPARIN SODIUM 5000 UNIT(S): 5000 INJECTION INTRAVENOUS; SUBCUTANEOUS at 05:10

## 2017-11-01 RX ADMIN — Medication 20 MILLIEQUIVALENT(S): at 12:46

## 2017-11-01 RX ADMIN — Medication 200 MILLIGRAM(S): at 18:20

## 2017-11-01 RX ADMIN — Medication 100 MILLIGRAM(S): at 05:10

## 2017-11-01 RX ADMIN — Medication 20 MILLIEQUIVALENT(S): at 11:08

## 2017-11-01 RX ADMIN — Medication 100 MILLIGRAM(S): at 12:46

## 2017-11-01 RX ADMIN — Medication 650 MILLIGRAM(S): at 05:11

## 2017-11-01 RX ADMIN — Medication 650 MILLIGRAM(S): at 18:21

## 2017-11-01 RX ADMIN — HEPARIN SODIUM 5000 UNIT(S): 5000 INJECTION INTRAVENOUS; SUBCUTANEOUS at 12:45

## 2017-11-01 RX ADMIN — Medication 100 MILLIGRAM(S): at 22:00

## 2017-11-01 RX ADMIN — Medication 650 MILLIGRAM(S): at 12:45

## 2017-11-01 RX ADMIN — Medication 20 MILLIEQUIVALENT(S): at 16:57

## 2017-11-01 NOTE — PROGRESS NOTE ADULT - SUBJECTIVE AND OBJECTIVE BOX
INFECTIOUS DISEASES FOLLOW UP-- Amarilys Blue  [phone]    This is a follow up note for this  42yFemale admitted with presumed sepsis post urologic procedure but with negative blood cultures and fevers trending down in the past 12hours after stopping her antibiotics  Sepsis      ROS:  CONSTITUTIONAL:  improving fever, fair appetite  CARDIOVASCULAR:  No chest pain or palpitations  RESPIRATORY:  occasional cough but off 02  GASTROINTESTINAL:  No nausea, vomiting, diarrhea, or abdominal pain  GENITOURINARY:  No dysuria  NEUROLOGIC:  No headache,     Allergies    mc(bitter melon)  okra  papaya (Urticaria)  many vegitables, and fruites (Urticaria)  No Known Drug Allergies    Intolerances        ANTIBIOTICS/RELEVANT:  antimicrobials    immunologic:  influenza   Vaccine 0.5 milliLiter(s) IntraMuscular once    OTHER:  acetaminophen   Tablet 650 milliGRAM(s) Oral every 6 hours PRN  dextrose 5%. 1000 milliLiter(s) IV Continuous <Continuous>  dextrose 50% Injectable 12.5 Gram(s) IV Push once  dextrose 50% Injectable 25 Gram(s) IV Push once  dextrose 50% Injectable 25 Gram(s) IV Push once  dextrose Gel 1 Dose(s) Oral once PRN  docusate sodium 100 milliGRAM(s) Oral three times a day  glucagon  Injectable 1 milliGRAM(s) IntraMuscular once PRN  guaiFENesin    Syrup 200 milliGRAM(s) Oral every 6 hours PRN  heparin  Injectable 5000 Unit(s) SubCutaneous every 8 hours  ibuprofen  Tablet 800 milliGRAM(s) Oral every 6 hours PRN  insulin lispro (HumaLOG) corrective regimen sliding scale   SubCutaneous three times a day before meals  insulin lispro (HumaLOG) corrective regimen sliding scale   SubCutaneous at bedtime  ondansetron Injectable 4 milliGRAM(s) IV Push every 8 hours PRN  oxyCODONE    5 mG/acetaminophen 325 mG 1 Tablet(s) Oral every 4 hours PRN  senna 2 Tablet(s) Oral at bedtime PRN  tamsulosin 0.4 milliGRAM(s) Oral at bedtime      Objective:  Vital Signs Last 24 Hrs  T(C): 37.3 (01 Nov 2017 16:51), Max: 38.6 (31 Oct 2017 22:55)  T(F): 99.2 (01 Nov 2017 16:51), Max: 101.5 (31 Oct 2017 22:55)  HR: 70 (01 Nov 2017 16:51) (67 - 82)  BP: 126/80 (01 Nov 2017 16:51) (126/64 - 147/86)  BP(mean): --  RR: 16 (01 Nov 2017 16:51) (16 - 18)  SpO2: 92% (01 Nov 2017 16:51) (92% - 95%)    PHYSICAL EXAM:  Constitutional:no acute distress  Eyes:ALDAIR, EOMI  Ear/Nose/Throat: no oral lesions, 	  Respiratory: clear BL occasional cough  Cardiovascular: S1S2 distant HS  Gastrointestinal:soft, (+) BS, no tenderness  Extremities:no e/e/c  No Lymphadenopathy  IV sites not inflammed.    LABS:                        9.3    11.1  )-----------( 295      ( 01 Nov 2017 07:16 )             29.2     11-01    140  |  100  |  5<L>  ----------------------------<  121<H>  3.1<L>   |  26  |  0.68    Ca    9.1      01 Nov 2017 07:13            MICROBIOLOGY:            RECENT CULTURES:  10-30 @ 15:13  .Blood Blood  --  --  --    No growth to date.  --  10-29 @ 08:57  .Urine Clean Catch (Midstream)  --  --  --    No growth  --  10-29 @ 05:56  .Blood Blood  --  --  --    No growth to date.  --  10-27 @ 22:28  .Urine Clean Catch (Midstream)  --  --  --    No growth  --  10-27 @ 21:24  .Blood Blood-Peripheral  --  --  --    No growth to date.  --      RADIOLOGY & ADDITIONAL STUDIES:  < from: CT Angio Chest w/ IV Cont (10.30.17 @ 23:48) >  IMPRESSION:   Limited evaluation for segmental and subsegmental pulmonary embolism. No   main, left, right, or lobar pulmonary embolism.    Scattered patchy consolidation left upper and lower lobe with scattered   nodularity, predominantly tree-in-bud may be infectious/inflammatory.   Follow-up to complete resolution recommended.    < end of copied text >

## 2017-11-01 NOTE — PROGRESS NOTE ADULT - SUBJECTIVE AND OBJECTIVE BOX
UROLOGY DAILY PROGRESS NOTE:     Subjective: Patient seen and examined at bedside. +fevers overnight. Abx stopped per ID yesterday.       Objective:  Vital signs  T(F): , Max: 101.5 (10-31-17 @ 22:55)  HR: 73 (11-01-17 @ 05:04)  BP: 147/86 (11-01-17 @ 05:04)  SpO2: 95% (11-01-17 @ 05:04)  Wt(kg): --    I&O's Summary    30 Oct 2017 07:01  -  31 Oct 2017 07:00  --------------------------------------------------------  IN: 4205 mL / OUT: 3725 mL / NET: 480 mL    31 Oct 2017 07:01  -  01 Nov 2017 06:34  --------------------------------------------------------  IN: 1560 mL / OUT: 3300 mL / NET: -1740 mL        Gen: NAD  Pulm: No respiratory distress, no subcostal retractions  CV: RRR, no JVD  Abd: Soft, NT, ND  : rodriguez clear    Labs:  10-31  12.3  / 31.6  /x      10-30  12.6  / 29.5  /0.66                           9.9    12.3  )-----------( 311      ( 31 Oct 2017 07:19 )             31.6     10-30    138  |  101  |  6<L>  ----------------------------<  165<H>  3.6   |  21<L>  |  0.66    Ca    8.6      30 Oct 2017 22:20          Urine Cx:  Culture - Urine (10.29.17 @ 08:57)    Specimen Source: .Urine Clean Catch (Midstream)    Culture Results:   No growth    Culture - Blood (10.30.17 @ 15:13)    Specimen Source: .Blood Blood    Culture Results:   No growth to date. UROLOGY DAILY PROGRESS NOTE:     Subjective: Patient seen and examined at bedside. +fevers overnight. Abx stopped per ID yesterday.  Patient with presumed sepsis secondary to UTI      Objective:  Vital signs  T(F): , Max: 101.5 (10-31-17 @ 22:55)  HR: 73 (11-01-17 @ 05:04)  BP: 147/86 (11-01-17 @ 05:04)  SpO2: 95% (11-01-17 @ 05:04)  Wt(kg): --    I&O's Summary    30 Oct 2017 07:01  -  31 Oct 2017 07:00  --------------------------------------------------------  IN: 4205 mL / OUT: 3725 mL / NET: 480 mL    31 Oct 2017 07:01  -  01 Nov 2017 06:34  --------------------------------------------------------  IN: 1560 mL / OUT: 3300 mL / NET: -1740 mL        Gen: NAD  Pulm: No respiratory distress, no subcostal retractions  CV: RRR, no JVD  Abd: Soft, NT, ND  : rodriguez clear    Labs:  10-31  12.3  / 31.6  /x      10-30  12.6  / 29.5  /0.66                           9.9    12.3  )-----------( 311      ( 31 Oct 2017 07:19 )             31.6     10-30    138  |  101  |  6<L>  ----------------------------<  165<H>  3.6   |  21<L>  |  0.66    Ca    8.6      30 Oct 2017 22:20          Urine Cx:  Culture - Urine (10.29.17 @ 08:57)    Specimen Source: .Urine Clean Catch (Midstream)    Culture Results:   No growth    Culture - Blood (10.30.17 @ 15:13)    Specimen Source: .Blood Blood    Culture Results:   No growth to date.

## 2017-11-01 NOTE — PROGRESS NOTE ADULT - ASSESSMENT
42 y F PMh DM, nephrolithiasis s/p left ureteroscopy and lithotripsy of stone with ureteral stent replacement 10/26, h/o Staph aureus in urine 9/2017 treated with bactrim, presenting with high grade fever, chills, dysuria and flank pain x 1 day. Admitted for urosepsis  She has remained febrile with high fevers despite broad spectrum antibiotics. A follow up CT scan shows pyelonephritis but no abscess and no hydronephrosis    Unclear at this point if fevers are still related to the pyelonephritis or antibiotics or another focus of infection but all cultures since admission are NGTD and respiratory sxs. are progressing. WBC stable at slightly elevated    Discussed with Urology PA-    1.discontinued all antibiotics and observing and patient seems to be improving.    Fevers may have been allergy manifestation    2. check TTE to assess for CHF as well as valves    3. Agree with Urine legionella ag. urine ag. negative  Can also send sputum for legionella culture if she is able to produce a specimen- not able to provide, dry cough but oxygenation is improving and she is nto wearing nasal 02 at this point and walked in the naik    4. HIv test was negative and CD4 count is wnl    Continue to observe off antibiotics

## 2017-11-02 ENCOUNTER — TRANSCRIPTION ENCOUNTER (OUTPATIENT)
Age: 42
End: 2017-11-02

## 2017-11-02 LAB
ANION GAP SERPL CALC-SCNC: 12 MMOL/L — SIGNIFICANT CHANGE UP (ref 5–17)
BUN SERPL-MCNC: 8 MG/DL — SIGNIFICANT CHANGE UP (ref 7–23)
CALCIUM SERPL-MCNC: 8.7 MG/DL — SIGNIFICANT CHANGE UP (ref 8.4–10.5)
CHLORIDE SERPL-SCNC: 100 MMOL/L — SIGNIFICANT CHANGE UP (ref 96–108)
CO2 SERPL-SCNC: 25 MMOL/L — SIGNIFICANT CHANGE UP (ref 22–31)
CREAT SERPL-MCNC: 0.62 MG/DL — SIGNIFICANT CHANGE UP (ref 0.5–1.3)
FUNGITELL: <31 PG/ML — SIGNIFICANT CHANGE UP (ref 0–59)
GLUCOSE SERPL-MCNC: 132 MG/DL — HIGH (ref 70–99)
HCT VFR BLD CALC: 26.1 % — LOW (ref 34.5–45)
HGB BLD-MCNC: 8.4 G/DL — LOW (ref 11.5–15.5)
LEGIONELLA AG UR QL: NEGATIVE — SIGNIFICANT CHANGE UP
MCHC RBC-ENTMCNC: 26.3 PG — LOW (ref 27–34)
MCHC RBC-ENTMCNC: 32 GM/DL — SIGNIFICANT CHANGE UP (ref 32–36)
MCV RBC AUTO: 82 FL — SIGNIFICANT CHANGE UP (ref 80–100)
PLATELET # BLD AUTO: 350 K/UL — SIGNIFICANT CHANGE UP (ref 150–400)
POTASSIUM SERPL-MCNC: 3.5 MMOL/L — SIGNIFICANT CHANGE UP (ref 3.5–5.3)
POTASSIUM SERPL-SCNC: 3.5 MMOL/L — SIGNIFICANT CHANGE UP (ref 3.5–5.3)
RBC # BLD: 3.18 M/UL — LOW (ref 3.8–5.2)
RBC # FLD: 13.3 % — SIGNIFICANT CHANGE UP (ref 10.3–14.5)
SODIUM SERPL-SCNC: 137 MMOL/L — SIGNIFICANT CHANGE UP (ref 135–145)
WBC # BLD: 11.4 K/UL — HIGH (ref 3.8–10.5)
WBC # FLD AUTO: 11.4 K/UL — HIGH (ref 3.8–10.5)

## 2017-11-02 PROCEDURE — 99232 SBSQ HOSP IP/OBS MODERATE 35: CPT

## 2017-11-02 RX ADMIN — Medication 200 MILLIGRAM(S): at 09:55

## 2017-11-02 RX ADMIN — Medication 100 MILLIGRAM(S): at 05:27

## 2017-11-02 RX ADMIN — HEPARIN SODIUM 5000 UNIT(S): 5000 INJECTION INTRAVENOUS; SUBCUTANEOUS at 13:30

## 2017-11-02 RX ADMIN — TAMSULOSIN HYDROCHLORIDE 0.4 MILLIGRAM(S): 0.4 CAPSULE ORAL at 22:42

## 2017-11-02 RX ADMIN — OXYCODONE AND ACETAMINOPHEN 1 TABLET(S): 5; 325 TABLET ORAL at 23:13

## 2017-11-02 RX ADMIN — Medication 650 MILLIGRAM(S): at 16:41

## 2017-11-02 RX ADMIN — Medication 100 MILLIGRAM(S): at 22:42

## 2017-11-02 RX ADMIN — OXYCODONE AND ACETAMINOPHEN 1 TABLET(S): 5; 325 TABLET ORAL at 22:42

## 2017-11-02 RX ADMIN — HEPARIN SODIUM 5000 UNIT(S): 5000 INJECTION INTRAVENOUS; SUBCUTANEOUS at 22:42

## 2017-11-02 RX ADMIN — Medication 200 MILLIGRAM(S): at 16:41

## 2017-11-02 RX ADMIN — Medication 200 MILLIGRAM(S): at 00:54

## 2017-11-02 RX ADMIN — Medication 650 MILLIGRAM(S): at 00:54

## 2017-11-02 RX ADMIN — Medication 100 MILLIGRAM(S): at 13:30

## 2017-11-02 RX ADMIN — HEPARIN SODIUM 5000 UNIT(S): 5000 INJECTION INTRAVENOUS; SUBCUTANEOUS at 05:27

## 2017-11-02 NOTE — DISCHARGE NOTE ADULT - SECONDARY DIAGNOSIS.
Type 2 diabetes mellitus without complication, unspecified long term insulin use status Shortness of breath

## 2017-11-02 NOTE — DISCHARGE NOTE ADULT - PLAN OF CARE
your fevers resolved once we stopped antibiotics you should tell all your doctors and pharmacists that you may be allergic to penicillin type medications.  all of your blood and urine cultures were negative for infection maintain adequate blood sugar continue home medications easy breathing your symptoms developed likely from all the iv fluids you required. please see your primary care doctor as an outpatient to arrange for an echocardiogram of your heart once you are feeling better. you should tell all your doctors and pharmacists that you may be allergic to penicillin type medications.  all of your blood and urine cultures were negative for infection  If you wish you may see Dr. Blue from [phone]

## 2017-11-02 NOTE — PROVIDER CONTACT NOTE (OTHER) - SITUATION
Patient's Temp 103
Pt c/o SOB satting at 80% on RA, O2 NC applied - 6L pt at 93%
Pt's temp rechecked. Temp 101.1
Temp retaken and is 102.6
Temp. 101, 
pt febrile 101.2
pt with fever of 100.8
Pt has a fever 100.4
pt has a temp of 103.0

## 2017-11-02 NOTE — PROGRESS NOTE ADULT - ASSESSMENT
42 y F PMh DM, nephrolithiasis s/p left ureteroscopy and lithotripsy of stone with ureteral stent replacement 10/26, h/o Staph aureus in urine 9/2017 treated with bactrim, presenting with high grade fever, chills, dysuria and flank pain x 1 day. Admitted for urosepsis  She has remained febrile with high fevers despite broad spectrum antibiotics. A follow up CT scan shows pyelonephritis but no abscess and no hydronephrosis    Unclear at this point if fevers are still related to the pyelonephritis or antibiotics or another focus of infection but all cultures since admission are NGTD and respiratory sxs. are progressing. WBC stable at slightly elevated    Discussed with Urology PA-  Patient much improved after discontinuing the antibiotics. it is likely that her symptoms and fevers were from the antibiotics  Would consider her to have a beta lactam allergy going forward

## 2017-11-02 NOTE — DISCHARGE NOTE ADULT - HOSPITAL COURSE
41 yo f with a pmh of dm2 and obesity who underwent a left urs and stent on 10/26. she arrived the following day with sepsis attributed to the urine. she remained persistently febrile. CT was negative and cultures were negative. ID and medicine were consulted. It was decided to take the pt off abx and see if this was a reaction to medication. Her fevers decreased and fevers were deemed to be from a new allergy to pcn. she was dcd in stable condition 41 yo f with a pmh of dm2 and obesity who underwent a left urs and stent on 10/26. she arrived the following day with sepsis attributed to the urine. she remained persistently febrile. CT was negative and cultures were negative. ID and medicine were consulted. It was decided to take the pt off abx and see if this was a reaction to medication. Her fevers decreased and fevers were deemed to be from a new allergy to pcn. she was dcd in stable condition. Her sepsis was secondary to the ureteroscopy.

## 2017-11-02 NOTE — PROVIDER CONTACT NOTE (OTHER) - RECOMMENDATIONS
Give Motrin and retake temp
Give Tylenol and retake temp
Give ordered Motrin for fever and recheck temperature
Nebulizer treatment, PA to evaluate, continue to monitor - maintain O2
give Tylenol
Give ordered Tylenol and recheck temp.

## 2017-11-02 NOTE — DISCHARGE NOTE ADULT - MEDICATION SUMMARY - MEDICATIONS TO STOP TAKING
I will STOP taking the medications listed below when I get home from the hospital:    Augmentin 875 mg-125 mg oral tablet  -- 1 tab(s) by mouth 2 times a day I will STOP taking the medications listed below when I get home from the hospital:  None

## 2017-11-02 NOTE — PROVIDER CONTACT NOTE (OTHER) - ACTION/TREATMENT ORDERED:
Gave Motrin as ordered and changed current antibiotics
Gave Tylenol and placed Catheter
MD made aware, give Tylenol as ordered, recheck Temp
MD Villa Govea stated to give ordered Motrin and recheck temperature.
NAZIA Griffin stated to give Motrin and retake temp
NAZIA Griffin stated to give Tylenol and retake temp
Neb ordered, pt remains on O2 NC
made team aware and Tylenol
MD Villa Govea ordered blood cultures and instructed to give PRN Tylenol for temp and recheck temp.

## 2017-11-02 NOTE — PROGRESS NOTE ADULT - SUBJECTIVE AND OBJECTIVE BOX
UROLOGY PA PROGRESS NOTE:     Subjective:  no complaints. denies CP. SOB, N/V. afebrile >24h    Objective:  Vital signs  T(F): , Max: 99.7 (11-01-17 @ 12:41)  HR: 71 (11-02-17 @ 00:29)  BP: 123/70 (11-02-17 @ 00:29)  SpO2: 96% (11-02-17 @ 00:29)  Wt(kg): --    Output     rodriguez- 2L        Physical Exam:  Gen: NAD  Abd: soft, NT, ND  : +rodriguez draining clear    Labs:  11-01  11.1  / 29.2  /x      11-01  x     / x     /0.68                           9.3    11.1  )-----------( 295      ( 01 Nov 2017 07:16 )             29.2     11-01    140  |  100  |  5<L>  ----------------------------<  121<H>  3.1<L>   |  26  |  0.68    Ca    9.1      01 Nov 2017 07:13

## 2017-11-02 NOTE — DISCHARGE NOTE ADULT - CARE PLAN
Principal Discharge DX:	Drug-induced fever  Secondary Diagnosis:	Type 2 diabetes mellitus without complication, unspecified long term insulin use status  Secondary Diagnosis:	Shortness of breath Principal Discharge DX:	Drug-induced fever  Goal:	your fevers resolved once we stopped antibiotics  Instructions for follow-up, activity and diet:	you should tell all your doctors and pharmacists that you may be allergic to penicillin type medications.  all of your blood and urine cultures were negative for infection  Secondary Diagnosis:	Type 2 diabetes mellitus without complication, unspecified long term insulin use status  Goal:	maintain adequate blood sugar  Instructions for follow-up, activity and diet:	continue home medications  Secondary Diagnosis:	Shortness of breath  Goal:	easy breathing  Instructions for follow-up, activity and diet:	your symptoms developed likely from all the iv fluids you required. please see your primary care doctor as an outpatient to arrange for an echocardiogram of your heart once you are feeling better. Principal Discharge DX:	Drug-induced fever  Goal:	your fevers resolved once we stopped antibiotics  Instructions for follow-up, activity and diet:	you should tell all your doctors and pharmacists that you may be allergic to penicillin type medications.  all of your blood and urine cultures were negative for infection  If you wish you may see Dr. Blue from [phone]  Secondary Diagnosis:	Type 2 diabetes mellitus without complication, unspecified long term insulin use status  Goal:	maintain adequate blood sugar  Instructions for follow-up, activity and diet:	continue home medications  Secondary Diagnosis:	Shortness of breath  Goal:	easy breathing  Instructions for follow-up, activity and diet:	your symptoms developed likely from all the iv fluids you required. please see your primary care doctor as an outpatient to arrange for an echocardiogram of your heart once you are feeling better.

## 2017-11-02 NOTE — DISCHARGE NOTE ADULT - MEDICATION SUMMARY - MEDICATIONS TO TAKE
I will START or STAY ON the medications listed below when I get home from the hospital:    metFORMIN 1000 mg oral tablet  -- 1 tab(s) by mouth 2 times a day  -- Indication: For home medication     valACYclovir 500 mg oral tablet  -- 1 tab(s) by mouth every 12 hours w/ Menstrual period  -- Indication: For home medication     Multigen Plus oral tablet  -- 1 tab(s) by mouth once a day  -- Indication: For home medication     B-12  -- 1 tab(s) by mouth once a day  -- Indication: For home medication

## 2017-11-02 NOTE — PROGRESS NOTE ADULT - SUBJECTIVE AND OBJECTIVE BOX
INFECTIOUS DISEASES FOLLOW UP-- Amarilys Blue  [phone]    This is a follow up note for this  42yFemale with s/p urology procedure for stone removal and stent placement. She developed fevers and presumed sepsis  but was also prescribed antibiotics. Her sxs. worsened during her hospital stay in terms of fevers until her antibiotics were discontinued at which point she began to recover.        ROS:  CONSTITUTIONAL:  No fever, good appetite  CARDIOVASCULAR:  No chest pain or palpitations  RESPIRATORY:  No dyspnea  GASTROINTESTINAL:  No nausea, vomiting, diarrhea, or abdominal pain  GENITOURINARY:  No dysuria  NEUROLOGIC:  No headache,     Allergies    mc(bitter melon)  okra  papaya (Urticaria)  many vegitables, and fruites (Urticaria)  penicillin (Fever)    Intolerances        ANTIBIOTICS/RELEVANT:  antimicrobials    immunologic:  influenza   Vaccine 0.5 milliLiter(s) IntraMuscular once    OTHER:  acetaminophen   Tablet 650 milliGRAM(s) Oral every 6 hours PRN  dextrose 5%. 1000 milliLiter(s) IV Continuous <Continuous>  dextrose 50% Injectable 12.5 Gram(s) IV Push once  dextrose 50% Injectable 25 Gram(s) IV Push once  dextrose 50% Injectable 25 Gram(s) IV Push once  dextrose Gel 1 Dose(s) Oral once PRN  docusate sodium 100 milliGRAM(s) Oral three times a day  glucagon  Injectable 1 milliGRAM(s) IntraMuscular once PRN  guaiFENesin    Syrup 200 milliGRAM(s) Oral every 6 hours PRN  heparin  Injectable 5000 Unit(s) SubCutaneous every 8 hours  ibuprofen  Tablet 800 milliGRAM(s) Oral every 6 hours PRN  insulin lispro (HumaLOG) corrective regimen sliding scale   SubCutaneous three times a day before meals  insulin lispro (HumaLOG) corrective regimen sliding scale   SubCutaneous at bedtime  ondansetron Injectable 4 milliGRAM(s) IV Push every 8 hours PRN  oxyCODONE    5 mG/acetaminophen 325 mG 1 Tablet(s) Oral every 4 hours PRN  senna 2 Tablet(s) Oral at bedtime PRN  tamsulosin 0.4 milliGRAM(s) Oral at bedtime      Objective:  Vital Signs Last 24 Hrs  T(C): 38.2 (02 Nov 2017 16:26), Max: 38.2 (02 Nov 2017 16:26)  T(F): 100.8 (02 Nov 2017 16:26), Max: 100.8 (02 Nov 2017 16:26)  HR: 79 (02 Nov 2017 16:26) (69 - 79)  BP: 144/83 (02 Nov 2017 16:26) (106/69 - 144/83)  BP(mean): --  RR: 16 (02 Nov 2017 16:26) (16 - 17)  SpO2: 93% (02 Nov 2017 16:26) (89% - 96%)    PHYSICAL EXAM:  Constitutional:no acute distress  Eyes:ALDAIR, EOMI  Ear/Nose/Throat: no oral lesions, 	  Respiratory: clear BL  Cardiovascular: S1S2  Gastrointestinal:soft, (+) BS, no tenderness  Extremities:no e/e/c  No Lymphadenopathy  IV sites not inflammed.    LABS:                        8.4    11.4  )-----------( 350      ( 02 Nov 2017 07:19 )             26.1     11-02    137  |  100  |  8   ----------------------------<  132<H>  3.5   |  25  |  0.62    Ca    8.7      02 Nov 2017 07:19            MICROBIOLOGY:            RECENT CULTURES:  10-30 @ 15:13  .Blood Blood  --  --  --    No growth to date.  --  10-29 @ 08:57  .Urine Clean Catch (Midstream)  --  --  --    No growth  --  10-29 @ 05:56  .Blood Blood  --  --  --    No growth to date.  --  10-27 @ 22:28  .Urine Clean Catch (Midstream)  --  --  --    No growth  --  10-27 @ 21:24  .Blood Blood-Peripheral  --  --  --    No growth at 5 days.  --      RADIOLOGY & ADDITIONAL STUDIES:

## 2017-11-03 VITALS — TEMPERATURE: 100 F

## 2017-11-03 DIAGNOSIS — E11.9 TYPE 2 DIABETES MELLITUS WITHOUT COMPLICATIONS: ICD-10-CM

## 2017-11-03 DIAGNOSIS — R50.2 DRUG INDUCED FEVER: ICD-10-CM

## 2017-11-03 LAB
CULTURE RESULTS: SIGNIFICANT CHANGE UP
CULTURE RESULTS: SIGNIFICANT CHANGE UP
HCT VFR BLD CALC: 27.8 % — LOW (ref 34.5–45)
HGB BLD-MCNC: 8.7 G/DL — LOW (ref 11.5–15.5)
MCHC RBC-ENTMCNC: 25.6 PG — LOW (ref 27–34)
MCHC RBC-ENTMCNC: 31.3 GM/DL — LOW (ref 32–36)
MCV RBC AUTO: 81.7 FL — SIGNIFICANT CHANGE UP (ref 80–100)
PLATELET # BLD AUTO: 388 K/UL — SIGNIFICANT CHANGE UP (ref 150–400)
RBC # BLD: 3.4 M/UL — LOW (ref 3.8–5.2)
RBC # FLD: 13.2 % — SIGNIFICANT CHANGE UP (ref 10.3–14.5)
SPECIMEN SOURCE: SIGNIFICANT CHANGE UP
SPECIMEN SOURCE: SIGNIFICANT CHANGE UP
WBC # BLD: 12.5 K/UL — HIGH (ref 3.8–10.5)
WBC # FLD AUTO: 12.5 K/UL — HIGH (ref 3.8–10.5)

## 2017-11-03 PROCEDURE — 85048 AUTOMATED LEUKOCYTE COUNT: CPT

## 2017-11-03 PROCEDURE — 85027 COMPLETE CBC AUTOMATED: CPT

## 2017-11-03 PROCEDURE — 84295 ASSAY OF SERUM SODIUM: CPT

## 2017-11-03 PROCEDURE — 83605 ASSAY OF LACTIC ACID: CPT

## 2017-11-03 PROCEDURE — 87798 DETECT AGENT NOS DNA AMP: CPT

## 2017-11-03 PROCEDURE — 82553 CREATINE MB FRACTION: CPT

## 2017-11-03 PROCEDURE — 84484 ASSAY OF TROPONIN QUANT: CPT

## 2017-11-03 PROCEDURE — 85730 THROMBOPLASTIN TIME PARTIAL: CPT

## 2017-11-03 PROCEDURE — 80048 BASIC METABOLIC PNL TOTAL CA: CPT

## 2017-11-03 PROCEDURE — 87633 RESP VIRUS 12-25 TARGETS: CPT

## 2017-11-03 PROCEDURE — 87449 NOS EACH ORGANISM AG IA: CPT

## 2017-11-03 PROCEDURE — 82803 BLOOD GASES ANY COMBINATION: CPT

## 2017-11-03 PROCEDURE — 85014 HEMATOCRIT: CPT

## 2017-11-03 PROCEDURE — 82947 ASSAY GLUCOSE BLOOD QUANT: CPT

## 2017-11-03 PROCEDURE — 99232 SBSQ HOSP IP/OBS MODERATE 35: CPT

## 2017-11-03 PROCEDURE — 82435 ASSAY OF BLOOD CHLORIDE: CPT

## 2017-11-03 PROCEDURE — 82550 ASSAY OF CK (CPK): CPT

## 2017-11-03 PROCEDURE — 87486 CHLMYD PNEUM DNA AMP PROBE: CPT

## 2017-11-03 PROCEDURE — 80053 COMPREHEN METABOLIC PANEL: CPT

## 2017-11-03 PROCEDURE — 84132 ASSAY OF SERUM POTASSIUM: CPT

## 2017-11-03 PROCEDURE — 82365 CALCULUS SPECTROSCOPY: CPT

## 2017-11-03 PROCEDURE — 87389 HIV-1 AG W/HIV-1&-2 AB AG IA: CPT

## 2017-11-03 PROCEDURE — 71275 CT ANGIOGRAPHY CHEST: CPT

## 2017-11-03 PROCEDURE — 87040 BLOOD CULTURE FOR BACTERIA: CPT

## 2017-11-03 PROCEDURE — 80202 ASSAY OF VANCOMYCIN: CPT

## 2017-11-03 PROCEDURE — 94640 AIRWAY INHALATION TREATMENT: CPT

## 2017-11-03 PROCEDURE — 85610 PROTHROMBIN TIME: CPT

## 2017-11-03 PROCEDURE — 99285 EMERGENCY DEPT VISIT HI MDM: CPT | Mod: 25

## 2017-11-03 PROCEDURE — 83880 ASSAY OF NATRIURETIC PEPTIDE: CPT

## 2017-11-03 PROCEDURE — 74177 CT ABD & PELVIS W/CONTRAST: CPT

## 2017-11-03 PROCEDURE — 74018 RADEX ABDOMEN 1 VIEW: CPT

## 2017-11-03 PROCEDURE — 87086 URINE CULTURE/COLONY COUNT: CPT

## 2017-11-03 PROCEDURE — 87581 M.PNEUMON DNA AMP PROBE: CPT

## 2017-11-03 PROCEDURE — 82962 GLUCOSE BLOOD TEST: CPT

## 2017-11-03 PROCEDURE — 71045 X-RAY EXAM CHEST 1 VIEW: CPT

## 2017-11-03 PROCEDURE — 93005 ELECTROCARDIOGRAM TRACING: CPT

## 2017-11-03 PROCEDURE — 82330 ASSAY OF CALCIUM: CPT

## 2017-11-03 PROCEDURE — 86360 T CELL ABSOLUTE COUNT/RATIO: CPT

## 2017-11-03 PROCEDURE — 81001 URINALYSIS AUTO W/SCOPE: CPT

## 2017-11-03 RX ORDER — ACETAMINOPHEN 500 MG
650 TABLET ORAL ONCE
Qty: 0 | Refills: 0 | Status: COMPLETED | OUTPATIENT
Start: 2017-11-03 | End: 2017-11-03

## 2017-11-03 RX ADMIN — OXYCODONE AND ACETAMINOPHEN 1 TABLET(S): 5; 325 TABLET ORAL at 04:15

## 2017-11-03 RX ADMIN — Medication 100 MILLIGRAM(S): at 12:51

## 2017-11-03 RX ADMIN — Medication 200 MILLIGRAM(S): at 12:17

## 2017-11-03 RX ADMIN — Medication 1: at 12:50

## 2017-11-03 RX ADMIN — Medication 200 MILLIGRAM(S): at 00:55

## 2017-11-03 RX ADMIN — HEPARIN SODIUM 5000 UNIT(S): 5000 INJECTION INTRAVENOUS; SUBCUTANEOUS at 12:51

## 2017-11-03 RX ADMIN — Medication 650 MILLIGRAM(S): at 18:16

## 2017-11-03 RX ADMIN — HEPARIN SODIUM 5000 UNIT(S): 5000 INJECTION INTRAVENOUS; SUBCUTANEOUS at 05:39

## 2017-11-03 RX ADMIN — OXYCODONE AND ACETAMINOPHEN 1 TABLET(S): 5; 325 TABLET ORAL at 11:30

## 2017-11-03 RX ADMIN — OXYCODONE AND ACETAMINOPHEN 1 TABLET(S): 5; 325 TABLET ORAL at 10:58

## 2017-11-03 RX ADMIN — Medication 100 MILLIGRAM(S): at 05:39

## 2017-11-03 RX ADMIN — OXYCODONE AND ACETAMINOPHEN 1 TABLET(S): 5; 325 TABLET ORAL at 03:45

## 2017-11-03 RX ADMIN — Medication 1: at 16:40

## 2017-11-03 NOTE — PROGRESS NOTE ADULT - SUBJECTIVE AND OBJECTIVE BOX
Authored by Dr Jam Bolivar [phone]     Patient is a 42y old  Female who presents with a chief complaint of fevers  after ureteroscopy and laser lithotripsy (02 Nov 2017 16:14)    SUBJECTIVE / OVERNIGHT EVENTS: no n/v/d. some L flank pain controlled w/meds. no dysuria. persistent cough but dyspnea improved. feels well.     MEDICATIONS  (STANDING):  dextrose 5%. 1000 milliLiter(s) (50 mL/Hr) IV Continuous <Continuous>  dextrose 50% Injectable 12.5 Gram(s) IV Push once  dextrose 50% Injectable 25 Gram(s) IV Push once  dextrose 50% Injectable 25 Gram(s) IV Push once  docusate sodium 100 milliGRAM(s) Oral three times a day  heparin  Injectable 5000 Unit(s) SubCutaneous every 8 hours  influenza   Vaccine 0.5 milliLiter(s) IntraMuscular once  insulin lispro (HumaLOG) corrective regimen sliding scale   SubCutaneous three times a day before meals  insulin lispro (HumaLOG) corrective regimen sliding scale   SubCutaneous at bedtime  tamsulosin 0.4 milliGRAM(s) Oral at bedtime    MEDICATIONS  (PRN):  acetaminophen   Tablet 650 milliGRAM(s) Oral every 6 hours PRN For Temp greater than 38.5 C (101.3 F)  dextrose Gel 1 Dose(s) Oral once PRN Blood Glucose LESS THAN 70 milliGRAM(s)/deciliter  glucagon  Injectable 1 milliGRAM(s) IntraMuscular once PRN Glucose LESS THAN 70 milligrams/deciliter  guaiFENesin    Syrup 200 milliGRAM(s) Oral every 6 hours PRN Cough  ibuprofen  Tablet 800 milliGRAM(s) Oral every 6 hours PRN fever  ondansetron Injectable 4 milliGRAM(s) IV Push every 8 hours PRN Nausea and/or Vomiting  oxyCODONE    5 mG/acetaminophen 325 mG 1 Tablet(s) Oral every 4 hours PRN Moderate Pain  senna 2 Tablet(s) Oral at bedtime PRN Constipation      Vital Signs Last 24 Hrs  T(C): 36.9 (03 Nov 2017 11:17), Max: 38.2 (02 Nov 2017 16:26)  T(F): 98.5 (03 Nov 2017 11:17), Max: 100.8 (02 Nov 2017 16:26)  HR: 79 (03 Nov 2017 11:17) (68 - 79)  BP: 121/74 (03 Nov 2017 11:17) (106/69 - 144/83)  BP(mean): --  RR: 18 (03 Nov 2017 11:17) (16 - 18)  SpO2: 97% (03 Nov 2017 11:17) (93% - 97%)  CAPILLARY BLOOD GLUCOSE      POCT Blood Glucose.: 135 mg/dL (03 Nov 2017 07:52)  POCT Blood Glucose.: 128 mg/dL (02 Nov 2017 21:30)  POCT Blood Glucose.: 138 mg/dL (02 Nov 2017 16:23)  POCT Blood Glucose.: 138 mg/dL (02 Nov 2017 11:50)    I&O's Summary    02 Nov 2017 07:01  -  03 Nov 2017 07:00  --------------------------------------------------------  IN: 1890 mL / OUT: 900 mL / NET: 990 mL        PHYSICAL EXAM  GENERAL: NAD, well-developed, obese  EYES: conjunctiva and sclera clear  NECK: Supple, No JVD  ENT: MMM  CHEST/LUNG: Clear to auscultation bilaterally; No wheeze  HEART: Regular rate and rhythm; No murmurs  ABDOMEN: Soft, Nontender, Nondistended; Bowel sounds present  EXTREMITIES:  2+ Peripheral Pulses, No clubbing, cyanosis, or edema  BACK: no CVA tenderness  NEURO: nonfocal, AOx3  PSYCH: calm   SKIN: No rashes or lesions    LABS:                        8.7    12.5  )-----------( 388      ( 03 Nov 2017 08:08 )             27.8     11-02    137  |  100  |  8   ----------------------------<  132<H>  3.5   |  25  |  0.62    Ca    8.7      02 Nov 2017 07:19    RADIOLOGY & ADDITIONAL TESTS:    Imaging Personally Reviewed:  Consultant(s) Notes Reviewed:  ID, uro  Care Discussed with Consultants/Other Providers:

## 2017-11-03 NOTE — PROGRESS NOTE ADULT - ASSESSMENT
42F w/ fevers s/p L URS  f/u ID , medicine  monitor for fevers off abx  f/u ID re: ECHO ok as outpatient

## 2017-11-03 NOTE — PROGRESS NOTE ADULT - ASSESSMENT
42F w/nephrolithiasis, T2DM, obesity with abdominoplasty/incisional hernia in setting of prior C sections,s/p left ureteroscopy and laser of stone with ureteral stent placement 10/26, was then admitted on this hospitalization on 10/27 with fevers and urosepsis, now with persistent spiking fevers felt to be related to beta lactam allergy

## 2017-11-03 NOTE — PROGRESS NOTE ADULT - SUBJECTIVE AND OBJECTIVE BOX
UROLOGY PA PROGRESS NOTE:     Subjective:  no acute events overnight , c/o mild left flank pain--> positional   temp of 100.8 yesterday    Objective:  Vital signs  T(F): , Max: 100.8 (11-02-17 @ 16:26)  HR: 68 (11-03-17 @ 05:28)  BP: 126/79 (11-03-17 @ 05:28)  SpO2: 94% (11-03-17 @ 05:28)  Wt(kg): --    Output     11-01 @ 07:01  -  11-02 @ 07:00  --------------------------------------------------------  IN: 2550 mL / OUT: 3150 mL / NET: -600 mL    11-02 @ 07:01  -  11-03 @ 06:17  --------------------------------------------------------  IN: 1890 mL / OUT: 900 mL / NET: 990 mL        Physical Exam:  Gen: NAD  Abd: soft, NT/ND  : voiding    Labs:  11-02  11.4  / 26.1  /0.62   11-01  11.1  / 29.2  /x                              8.4    11.4  )-----------( 350      ( 02 Nov 2017 07:19 )             26.1     11-02    137  |  100  |  8   ----------------------------<  132<H>  3.5   |  25  |  0.62    Ca    8.7      02 Nov 2017 07:19

## 2017-11-03 NOTE — PROGRESS NOTE ADULT - PROBLEM SELECTOR PLAN 1
Beta lactam allergy added to list as per ID, improving off abx
fevers s/p L URS  CBC, BMP, BNP  F/U ID and Med  Observe off abx  f/u cultures  check TTE
fevers s/p L URS  CBC, BMP, BNP  F/U ID and Med  Observe off abx  f/u cultures  check TTE

## 2017-11-04 LAB
CULTURE RESULTS: SIGNIFICANT CHANGE UP
SPECIMEN SOURCE: SIGNIFICANT CHANGE UP

## 2017-11-06 ENCOUNTER — APPOINTMENT (OUTPATIENT)
Dept: UROLOGY | Facility: CLINIC | Age: 42
End: 2017-11-06

## 2017-11-07 ENCOUNTER — APPOINTMENT (OUTPATIENT)
Dept: UROLOGY | Facility: CLINIC | Age: 42
End: 2017-11-07
Payer: COMMERCIAL

## 2017-11-07 ENCOUNTER — APPOINTMENT (OUTPATIENT)
Dept: UROLOGY | Facility: CLINIC | Age: 42
End: 2017-11-07

## 2017-11-07 VITALS
RESPIRATION RATE: 19 BRPM | SYSTOLIC BLOOD PRESSURE: 120 MMHG | WEIGHT: 210 LBS | BODY MASS INDEX: 38.64 KG/M2 | HEIGHT: 62 IN | TEMPERATURE: 99.7 F | DIASTOLIC BLOOD PRESSURE: 80 MMHG | HEART RATE: 84 BPM

## 2017-11-07 VITALS — SYSTOLIC BLOOD PRESSURE: 150 MMHG | HEART RATE: 93 BPM | TEMPERATURE: 98.9 F | DIASTOLIC BLOOD PRESSURE: 90 MMHG

## 2017-11-07 PROCEDURE — 52310 CYSTOSCOPY AND TREATMENT: CPT

## 2017-12-01 ENCOUNTER — RESULT REVIEW (OUTPATIENT)
Age: 42
End: 2017-12-01

## 2018-02-14 ENCOUNTER — APPOINTMENT (OUTPATIENT)
Dept: UROLOGY | Facility: CLINIC | Age: 43
End: 2018-02-14
Payer: COMMERCIAL

## 2018-02-14 VITALS
BODY MASS INDEX: 39.93 KG/M2 | HEART RATE: 95 BPM | RESPIRATION RATE: 17 BRPM | DIASTOLIC BLOOD PRESSURE: 80 MMHG | TEMPERATURE: 97.7 F | WEIGHT: 217 LBS | HEIGHT: 62 IN | SYSTOLIC BLOOD PRESSURE: 130 MMHG

## 2018-02-14 PROCEDURE — 99213 OFFICE O/P EST LOW 20 MIN: CPT

## 2018-02-21 ENCOUNTER — RESULT REVIEW (OUTPATIENT)
Age: 43
End: 2018-02-21

## 2018-04-10 ENCOUNTER — APPOINTMENT (OUTPATIENT)
Dept: SURGERY | Facility: CLINIC | Age: 43
End: 2018-04-10
Payer: COMMERCIAL

## 2018-04-10 VITALS
BODY MASS INDEX: 39.93 KG/M2 | SYSTOLIC BLOOD PRESSURE: 135 MMHG | TEMPERATURE: 98.9 F | HEIGHT: 62 IN | DIASTOLIC BLOOD PRESSURE: 87 MMHG | HEART RATE: 88 BPM | WEIGHT: 217 LBS

## 2018-04-10 PROCEDURE — 99214 OFFICE O/P EST MOD 30 MIN: CPT

## 2018-05-15 ENCOUNTER — APPOINTMENT (OUTPATIENT)
Dept: UROLOGY | Facility: CLINIC | Age: 43
End: 2018-05-15
Payer: COMMERCIAL

## 2018-05-15 VITALS
TEMPERATURE: 98.6 F | RESPIRATION RATE: 17 BRPM | SYSTOLIC BLOOD PRESSURE: 122 MMHG | DIASTOLIC BLOOD PRESSURE: 84 MMHG | OXYGEN SATURATION: 97 % | HEIGHT: 62 IN | HEART RATE: 90 BPM | WEIGHT: 220 LBS | BODY MASS INDEX: 40.48 KG/M2

## 2018-05-15 PROCEDURE — 99213 OFFICE O/P EST LOW 20 MIN: CPT

## 2018-07-10 ENCOUNTER — APPOINTMENT (OUTPATIENT)
Dept: UROLOGY | Facility: CLINIC | Age: 43
End: 2018-07-10
Payer: COMMERCIAL

## 2018-07-10 VITALS
HEART RATE: 100 BPM | SYSTOLIC BLOOD PRESSURE: 130 MMHG | OXYGEN SATURATION: 96 % | TEMPERATURE: 98.9 F | DIASTOLIC BLOOD PRESSURE: 86 MMHG

## 2018-07-10 PROCEDURE — 99214 OFFICE O/P EST MOD 30 MIN: CPT

## 2018-07-10 RX ORDER — NYSTATIN AND TRIAMCINOLONE ACETONIDE 100000; 1 MG/G; MG/G
100000-0.1 CREAM TOPICAL
Qty: 30 | Refills: 0 | Status: ACTIVE | COMMUNITY
Start: 2018-05-07

## 2018-07-11 ENCOUNTER — APPOINTMENT (OUTPATIENT)
Dept: CT IMAGING | Facility: HOSPITAL | Age: 43
End: 2018-07-11
Payer: COMMERCIAL

## 2018-07-11 ENCOUNTER — OUTPATIENT (OUTPATIENT)
Dept: OUTPATIENT SERVICES | Facility: HOSPITAL | Age: 43
LOS: 1 days | End: 2018-07-11
Payer: COMMERCIAL

## 2018-07-11 DIAGNOSIS — Z98.890 OTHER SPECIFIED POSTPROCEDURAL STATES: Chronic | ICD-10-CM

## 2018-07-11 DIAGNOSIS — R31.29 OTHER MICROSCOPIC HEMATURIA: ICD-10-CM

## 2018-07-11 DIAGNOSIS — Z98.89 OTHER SPECIFIED POSTPROCEDURAL STATES: Chronic | ICD-10-CM

## 2018-07-11 DIAGNOSIS — Z98.891 HISTORY OF UTERINE SCAR FROM PREVIOUS SURGERY: Chronic | ICD-10-CM

## 2018-07-11 DIAGNOSIS — N20.0 CALCULUS OF KIDNEY: ICD-10-CM

## 2018-07-11 LAB
ANION GAP SERPL CALC-SCNC: 18 MMOL/L
APPEARANCE: CLEAR
BACTERIA: NEGATIVE
BASOPHILS # BLD AUTO: 0.04 K/UL
BASOPHILS NFR BLD AUTO: 0.4 %
BILIRUBIN URINE: NEGATIVE
BLOOD URINE: NEGATIVE
BUN SERPL-MCNC: 11 MG/DL
CALCIUM SERPL-MCNC: 9.9 MG/DL
CHLORIDE SERPL-SCNC: 99 MMOL/L
CO2 SERPL-SCNC: 22 MMOL/L
COLOR: YELLOW
CREAT SERPL-MCNC: 0.74 MG/DL
EOSINOPHIL # BLD AUTO: 0.32 K/UL
EOSINOPHIL NFR BLD AUTO: 3.1 %
GLUCOSE QUALITATIVE U: 1000 MG/DL
GLUCOSE SERPL-MCNC: 163 MG/DL
HCT VFR BLD CALC: 37 %
HGB BLD-MCNC: 11.4 G/DL
HYALINE CASTS: 1 /LPF
IMM GRANULOCYTES NFR BLD AUTO: 0.5 %
KETONES URINE: NEGATIVE
LEUKOCYTE ESTERASE URINE: NEGATIVE
LYMPHOCYTES # BLD AUTO: 2.36 K/UL
LYMPHOCYTES NFR BLD AUTO: 22.8 %
MAN DIFF?: NORMAL
MCHC RBC-ENTMCNC: 25.1 PG
MCHC RBC-ENTMCNC: 30.8 GM/DL
MCV RBC AUTO: 81.5 FL
MICROSCOPIC-UA: NORMAL
MONOCYTES # BLD AUTO: 0.55 K/UL
MONOCYTES NFR BLD AUTO: 5.3 %
NEUTROPHILS # BLD AUTO: 7.01 K/UL
NEUTROPHILS NFR BLD AUTO: 67.9 %
NITRITE URINE: NEGATIVE
PH URINE: 5
PLATELET # BLD AUTO: 385 K/UL
POTASSIUM SERPL-SCNC: 4.1 MMOL/L
PROTEIN URINE: NEGATIVE MG/DL
RBC # BLD: 4.54 M/UL
RBC # FLD: 15 %
RED BLOOD CELLS URINE: 2 /HPF
SODIUM SERPL-SCNC: 139 MMOL/L
SPECIFIC GRAVITY URINE: 1.02
SQUAMOUS EPITHELIAL CELLS: 1 /HPF
UROBILINOGEN URINE: NEGATIVE MG/DL
WBC # FLD AUTO: 10.33 K/UL
WHITE BLOOD CELLS URINE: 2 /HPF

## 2018-07-11 PROCEDURE — 74176 CT ABD & PELVIS W/O CONTRAST: CPT

## 2018-07-11 PROCEDURE — 74176 CT ABD & PELVIS W/O CONTRAST: CPT | Mod: 26

## 2018-07-12 LAB — BACTERIA UR CULT: NORMAL

## 2018-07-13 ENCOUNTER — APPOINTMENT (OUTPATIENT)
Dept: UROLOGY | Facility: CLINIC | Age: 43
End: 2018-07-13
Payer: COMMERCIAL

## 2018-07-13 VITALS
WEIGHT: 220 LBS | SYSTOLIC BLOOD PRESSURE: 130 MMHG | OXYGEN SATURATION: 97 % | HEIGHT: 62 IN | DIASTOLIC BLOOD PRESSURE: 80 MMHG | BODY MASS INDEX: 40.48 KG/M2 | RESPIRATION RATE: 17 BRPM | HEART RATE: 96 BPM

## 2018-07-13 DIAGNOSIS — R10.9 UNSPECIFIED ABDOMINAL PAIN: ICD-10-CM

## 2018-07-13 PROCEDURE — 99213 OFFICE O/P EST LOW 20 MIN: CPT

## 2018-08-07 PROBLEM — R31.9 HEMATURIA, UNSPECIFIED: Chronic | Status: ACTIVE | Noted: 2017-10-19

## 2018-08-07 PROBLEM — E11.9 TYPE 2 DIABETES MELLITUS WITHOUT COMPLICATIONS: Chronic | Status: ACTIVE | Noted: 2017-10-19

## 2018-08-07 PROBLEM — R39.89 OTHER SYMPTOMS AND SIGNS INVOLVING THE GENITOURINARY SYSTEM: Chronic | Status: ACTIVE | Noted: 2017-10-19

## 2018-08-07 PROBLEM — E04.1 NONTOXIC SINGLE THYROID NODULE: Chronic | Status: ACTIVE | Noted: 2017-10-19

## 2018-08-16 PROBLEM — D44.0 NEOPLASM OF UNCERTAIN BEHAVIOR OF THYROID GLAND: Status: ACTIVE | Noted: 2018-08-16

## 2018-08-17 ENCOUNTER — APPOINTMENT (OUTPATIENT)
Dept: SURGERY | Facility: CLINIC | Age: 43
End: 2018-08-17
Payer: COMMERCIAL

## 2018-08-17 VITALS
DIASTOLIC BLOOD PRESSURE: 81 MMHG | WEIGHT: 220 LBS | HEART RATE: 77 BPM | TEMPERATURE: 98.9 F | SYSTOLIC BLOOD PRESSURE: 149 MMHG | OXYGEN SATURATION: 96 % | HEIGHT: 61 IN | BODY MASS INDEX: 41.54 KG/M2

## 2018-08-17 DIAGNOSIS — D44.0 NEOPLASM OF UNCERTAIN BEHAVIOR OF THYROID GLAND: ICD-10-CM

## 2018-08-17 PROCEDURE — 99215 OFFICE O/P EST HI 40 MIN: CPT

## 2018-08-24 ENCOUNTER — EMERGENCY (EMERGENCY)
Facility: HOSPITAL | Age: 43
LOS: 1 days | Discharge: HOME | End: 2018-08-24
Attending: EMERGENCY MEDICINE
Payer: COMMERCIAL

## 2018-08-24 VITALS — WEIGHT: 220.02 LBS | HEIGHT: 61 IN

## 2018-08-24 VITALS
HEART RATE: 89 BPM | DIASTOLIC BLOOD PRESSURE: 89 MMHG | TEMPERATURE: 98 F | OXYGEN SATURATION: 96 % | SYSTOLIC BLOOD PRESSURE: 152 MMHG | RESPIRATION RATE: 20 BRPM

## 2018-08-24 DIAGNOSIS — Z98.891 HISTORY OF UTERINE SCAR FROM PREVIOUS SURGERY: Chronic | ICD-10-CM

## 2018-08-24 DIAGNOSIS — Z98.89 OTHER SPECIFIED POSTPROCEDURAL STATES: Chronic | ICD-10-CM

## 2018-08-24 DIAGNOSIS — Z98.890 OTHER SPECIFIED POSTPROCEDURAL STATES: Chronic | ICD-10-CM

## 2018-08-24 PROCEDURE — 56405 I&D VULVA/PERINEAL ABSCESS: CPT

## 2018-08-24 PROCEDURE — 99283 EMERGENCY DEPT VISIT LOW MDM: CPT | Mod: 25

## 2018-08-24 PROCEDURE — 82962 GLUCOSE BLOOD TEST: CPT

## 2018-08-24 RX ORDER — AZTREONAM 2 G
1 VIAL (EA) INJECTION
Qty: 14 | Refills: 0 | OUTPATIENT
Start: 2018-08-24 | End: 2018-08-30

## 2018-08-24 NOTE — ED PROVIDER NOTE - GENITOURINARY, MLM
No discharge, lesions. Shaved groin area. Dime sized abscess with fluctuance area at right mons pubis. No signs of korin's.

## 2018-08-24 NOTE — ED PROVIDER NOTE - OBJECTIVE STATEMENT
42 y/o F pt with a significant PMHx of DM and a PSHx as listed presents to the ED c/o right abscess in groin region. Pt admits to shaving and gets abscess's frequently. Patient denies fever, chills, dysuria, abd pain, vaginal discharge, or any other complaints. NKDA.

## 2018-08-24 NOTE — ED PROVIDER NOTE - PROGRESS NOTE DETAILS
I&D at the right mons pubis's. Use 11in scapula packed with iodoform. Wound care instructions given. Follow up in 3 days here in the ED.

## 2018-08-24 NOTE — ED ADULT NURSE NOTE - NSIMPLEMENTINTERV_GEN_ALL_ED
Implemented All Universal Safety Interventions:  [place] to call system. Call bell, personal items and telephone within reach. Instruct patient to call for assistance. Room bathroom lighting operational. Non-slip footwear when patient is off stretcher. Physically safe environment: no spills, clutter or unnecessary equipment. Stretcher in lowest position, wheels locked, appropriate side rails in place.

## 2018-08-24 NOTE — ED PROVIDER NOTE - CONSTITUTIONAL, MLM
normal... Well appearing, well nourished, awake, alert, oriented to person, place, time/situation and in no apparent distress. Obsessed female.

## 2018-08-27 ENCOUNTER — EMERGENCY (EMERGENCY)
Facility: HOSPITAL | Age: 43
LOS: 1 days | Discharge: HOME | End: 2018-08-27
Attending: EMERGENCY MEDICINE
Payer: COMMERCIAL

## 2018-08-27 VITALS
DIASTOLIC BLOOD PRESSURE: 78 MMHG | RESPIRATION RATE: 16 BRPM | TEMPERATURE: 99 F | OXYGEN SATURATION: 99 % | HEART RATE: 74 BPM | SYSTOLIC BLOOD PRESSURE: 122 MMHG

## 2018-08-27 VITALS — HEIGHT: 61 IN | WEIGHT: 220.02 LBS

## 2018-08-27 DIAGNOSIS — Z98.89 OTHER SPECIFIED POSTPROCEDURAL STATES: Chronic | ICD-10-CM

## 2018-08-27 DIAGNOSIS — Z98.891 HISTORY OF UTERINE SCAR FROM PREVIOUS SURGERY: Chronic | ICD-10-CM

## 2018-08-27 DIAGNOSIS — Z98.890 OTHER SPECIFIED POSTPROCEDURAL STATES: Chronic | ICD-10-CM

## 2018-08-27 LAB
APPEARANCE UR: CLEAR — SIGNIFICANT CHANGE UP
BILIRUB UR-MCNC: NEGATIVE — SIGNIFICANT CHANGE UP
COLOR SPEC: YELLOW — SIGNIFICANT CHANGE UP
DIFF PNL FLD: NEGATIVE — SIGNIFICANT CHANGE UP
GLUCOSE UR QL: NEGATIVE — SIGNIFICANT CHANGE UP
HCG UR QL: NEGATIVE — SIGNIFICANT CHANGE UP
KETONES UR-MCNC: NEGATIVE — SIGNIFICANT CHANGE UP
LEUKOCYTE ESTERASE UR-ACNC: NEGATIVE — SIGNIFICANT CHANGE UP
NITRITE UR-MCNC: NEGATIVE — SIGNIFICANT CHANGE UP
PH UR: 5 — SIGNIFICANT CHANGE UP (ref 5–8)
PROT UR-MCNC: NEGATIVE — SIGNIFICANT CHANGE UP
SP GR SPEC: 1.02 — SIGNIFICANT CHANGE UP (ref 1.01–1.02)
UROBILINOGEN FLD QL: NEGATIVE — SIGNIFICANT CHANGE UP

## 2018-08-27 PROCEDURE — 99284 EMERGENCY DEPT VISIT MOD MDM: CPT | Mod: 25

## 2018-08-27 PROCEDURE — 81003 URINALYSIS AUTO W/O SCOPE: CPT

## 2018-08-27 PROCEDURE — 81025 URINE PREGNANCY TEST: CPT

## 2018-08-27 PROCEDURE — 99283 EMERGENCY DEPT VISIT LOW MDM: CPT

## 2018-08-27 RX ADMIN — Medication 1 TABLET(S): at 18:06

## 2018-08-27 NOTE — ED PROVIDER NOTE - MEDICAL DECISION MAKING DETAILS
42 yo fem, s/p I&D - will remove packing, start bactrim. UA and CT to eval UTI and obstructing stone.

## 2018-08-27 NOTE — ED ADULT NURSE NOTE - OBJECTIVE STATEMENT
came in for removal of packing from right mons pubis.Seen and examined by Kavon MATTHEWS.Packing removed by Kavon MATTHEWS , dressings changed

## 2018-08-27 NOTE — ED PROVIDER NOTE - NS ED ROS FT
Constitutional: - Fever, - Chills, - Anorexia, - Fatigue  CV: - Palpitations, - Chest Pain, - Edema   RESP:  - Cough, - Shortness of Breath, - Dyspnea on Exertion, - Trouble speaking, - Pain with breathing, - Wheezing  GI: - Diarrhea, - Constipation, - Bloody stools, - Nausea, - Vomiting, - Abdominal Pain, + Flank pain  : - Dysuria, -Frequency, - Hematuria, - Hesitancy, - Incontinence  MSK: - Myalgias, - Arthralgias, - Weakness, - Deformities, - Injuries  SKIN: - Color change, - Rash, - Swelling, - Bruises, + Wounds  NEURO: - Change in behavior, - Dec. Alertness, - Headache, - Dizziness, - Numbness/Tingling

## 2018-08-27 NOTE — ED ADULT NURSE NOTE - NSIMPLEMENTINTERV_GEN_ALL_ED
Implemented All Universal Safety Interventions:  [place] to call system. Call bell, personal items and telephone within reach. Instruct patient to call for assistance. Room bathroom lighting operational. Non-slip footwear when patient is off stretcher. Physically safe environment: no spills, clutter or unnecessary equipment. Stretcher in lowest position, wheels locked, appropriate side rails in place.

## 2018-08-27 NOTE — ED ADULT TRIAGE NOTE - NS ED NURSE BANDS TYPE
Follow up from ED visit needing IV     Started 5 days ago no eating well. No eating 2 days ago, yesterday am woke up lethargic. Went to [facility] ED and IV given x's 2. Her HCO3 was 15 without any vomiting or diarrhea, just not drinking for 4 days.    No emesis. Has not had diarrhea at home had one every day last week at . Had one Saturday PM was white, luly like. After ED visit has had two stools. This am a formed yellow blood string on outside. A second stool in office was more liquidy.  Low grade temp off an on throughout.    After IV boluses mid afternoon she seemed better. By 5 pm perked up a little. Drank some water went home  Today a little bit food. Drinking some.      GENERAL:  alert, active, comfortable, not toxic, and in no acute distress and quiet, sitting on dad's lap. calm  HYDRATION:  well hydrated: moist mucous membranes, good tear production & skin turgor, eyes not sunken  EARS:  Left TM normal and Right TM normal  NOSE:  Normal mucosa, nares patent, septum nl.  No drainage or sinus tenderness.  THROAT:  no significant tonsillar hypertrophy or inflam., or oropharyngeal lesions  NECK:  supple and no cervical or supraclavicular lymphadenopathy  HEART:  quiet precordium, regular rate and rhythm without murmurs, clicks, or gallops and Capillary refill test <2 secs.  LUNGS:  Chest totally clear; no rales, rhonchi, wheezes or stridor, Excellent air exchange with normal I/E; and no tachyp or retractions  ABDOMEN:  Sitting, abdomen protruberant. Bowel sounds very active    A/P:  See Diagnosis, Orders/Medication, and Follow-up instructions.  Xray with either ileus or distal bowel obstruction  She does not appear uncomfortable at this time (did after her small morning breakfast)   abd soft but is refusing all liquids  Unusual story  Send to [facility] ED. I spoke with attending    More than 25 minute visit, more than 50% counseling     Name band;

## 2018-08-27 NOTE — ED PROVIDER NOTE - OBJECTIVE STATEMENT
this is a 44 yo fem who is returning to the ED after I&D on Friday to the right mons pubis. reports persistent drainage, denies fevers or chills. has been taking levaquin as prescribed by Dr. Quispe but not the bactrim prescribed by the ED. pt also reports new right flank pain starting this morning and is concerned that her right renal stone is moving. denies nausea, vomiting, urinary symptoms. no other complaints at this time.

## 2018-08-27 NOTE — ED PROVIDER NOTE - PHYSICAL EXAMINATION
PE:   GEN: Awake, alert, interactive, NAD, non-toxic appearing.   HEAD: Atraumatic  EYES: Sclera white, conjunctiva pink, PERRLA  CARDIAC: Reg rate and rhythm, S1,S2, no murmur/rub/gallop. Strong central and peripheral pulses, Brisk cap refill, no evident pedal edema.   RESP: No distress noted. L/S clear = Bilat without accessory muscle use, wheeze, rhonchi, rales.   ABD: soft, supple, non-tender, no guarding. BS x 4, normoactive.   NEURO: AOx3, CN II-XII grossly intact without focal deficit.   MSK: Moving all extremities with no apparent deformities. neg CVAT  SKIN (Roberth Lynch, RN): Warm, dry, normal color, without apparent rashes. + 1 cm incision with packing to Right MONS PUBIS with mild drainage, no erythema or warmth

## 2018-08-27 NOTE — ED PROVIDER NOTE - PROGRESS NOTE DETAILS
UA neg for uti/blood. low suspicion for obstructing stone. discussed with patient. concepción berrios.

## 2018-09-07 ENCOUNTER — OUTPATIENT (OUTPATIENT)
Dept: OUTPATIENT SERVICES | Facility: HOSPITAL | Age: 43
LOS: 1 days | End: 2018-09-07
Payer: COMMERCIAL

## 2018-09-07 VITALS
TEMPERATURE: 97 F | RESPIRATION RATE: 16 BRPM | SYSTOLIC BLOOD PRESSURE: 140 MMHG | HEIGHT: 61 IN | HEART RATE: 85 BPM | WEIGHT: 223.99 LBS | DIASTOLIC BLOOD PRESSURE: 80 MMHG

## 2018-09-07 DIAGNOSIS — E11.9 TYPE 2 DIABETES MELLITUS WITHOUT COMPLICATIONS: ICD-10-CM

## 2018-09-07 DIAGNOSIS — Z98.89 OTHER SPECIFIED POSTPROCEDURAL STATES: Chronic | ICD-10-CM

## 2018-09-07 DIAGNOSIS — Z98.890 OTHER SPECIFIED POSTPROCEDURAL STATES: Chronic | ICD-10-CM

## 2018-09-07 DIAGNOSIS — Z98.891 HISTORY OF UTERINE SCAR FROM PREVIOUS SURGERY: Chronic | ICD-10-CM

## 2018-09-07 DIAGNOSIS — E04.9 NONTOXIC GOITER, UNSPECIFIED: ICD-10-CM

## 2018-09-07 DIAGNOSIS — E04.1 NONTOXIC SINGLE THYROID NODULE: ICD-10-CM

## 2018-09-07 LAB
BLD GP AB SCN SERPL QL: NEGATIVE — SIGNIFICANT CHANGE UP
BUN SERPL-MCNC: 8 MG/DL — SIGNIFICANT CHANGE UP (ref 7–23)
CALCIUM SERPL-MCNC: 9.8 MG/DL — SIGNIFICANT CHANGE UP (ref 8.4–10.5)
CHLORIDE SERPL-SCNC: 98 MMOL/L — SIGNIFICANT CHANGE UP (ref 98–107)
CO2 SERPL-SCNC: 23 MMOL/L — SIGNIFICANT CHANGE UP (ref 22–31)
CREAT SERPL-MCNC: 0.64 MG/DL — SIGNIFICANT CHANGE UP (ref 0.5–1.3)
GLUCOSE SERPL-MCNC: 124 MG/DL — HIGH (ref 70–99)
HBA1C BLD-MCNC: 7.5 % — HIGH (ref 4–5.6)
HCT VFR BLD CALC: 37.3 % — SIGNIFICANT CHANGE UP (ref 34.5–45)
HGB BLD-MCNC: 11.8 G/DL — SIGNIFICANT CHANGE UP (ref 11.5–15.5)
MCHC RBC-ENTMCNC: 25.7 PG — LOW (ref 27–34)
MCHC RBC-ENTMCNC: 31.6 % — LOW (ref 32–36)
MCV RBC AUTO: 81.1 FL — SIGNIFICANT CHANGE UP (ref 80–100)
NRBC # FLD: 0 — SIGNIFICANT CHANGE UP
PLATELET # BLD AUTO: 379 K/UL — SIGNIFICANT CHANGE UP (ref 150–400)
PMV BLD: 11.4 FL — SIGNIFICANT CHANGE UP (ref 7–13)
POTASSIUM SERPL-MCNC: 3.8 MMOL/L — SIGNIFICANT CHANGE UP (ref 3.5–5.3)
POTASSIUM SERPL-SCNC: 3.8 MMOL/L — SIGNIFICANT CHANGE UP (ref 3.5–5.3)
RBC # BLD: 4.6 M/UL — SIGNIFICANT CHANGE UP (ref 3.8–5.2)
RBC # FLD: 14.7 % — HIGH (ref 10.3–14.5)
RH IG SCN BLD-IMP: POSITIVE — SIGNIFICANT CHANGE UP
SODIUM SERPL-SCNC: 137 MMOL/L — SIGNIFICANT CHANGE UP (ref 135–145)
WBC # BLD: 12.27 K/UL — HIGH (ref 3.8–10.5)
WBC # FLD AUTO: 12.27 K/UL — HIGH (ref 3.8–10.5)

## 2018-09-07 PROCEDURE — 93010 ELECTROCARDIOGRAM REPORT: CPT

## 2018-09-07 RX ORDER — METFORMIN HYDROCHLORIDE 850 MG/1
1 TABLET ORAL
Qty: 0 | Refills: 0 | COMMUNITY

## 2018-09-07 RX ORDER — VALACYCLOVIR 500 MG/1
1 TABLET, FILM COATED ORAL
Qty: 0 | Refills: 0 | COMMUNITY

## 2018-09-07 RX ORDER — PREGABALIN 225 MG/1
1 CAPSULE ORAL
Qty: 0 | Refills: 0 | COMMUNITY

## 2018-09-07 RX ORDER — BENZOYL PEROXIDE MICRONIZED 5.8 %
1 TOWELETTE (EA) TOPICAL
Qty: 0 | Refills: 0 | COMMUNITY

## 2018-09-07 RX ORDER — SODIUM CHLORIDE 9 MG/ML
1000 INJECTION, SOLUTION INTRAVENOUS
Qty: 0 | Refills: 0 | Status: DISCONTINUED | OUTPATIENT
Start: 2018-09-19 | End: 2018-09-19

## 2018-09-07 NOTE — H&P PST ADULT - PMH
Genital sore  recurrent w/Menstrual period, uses PRN Valcyclovir.  Hematuria    Hernia  umbilical  Kidney stones    Thyroid nodule  s/p biopsy 2015  Type 2 diabetes mellitus without complication, unspecified long term insulin use status

## 2018-09-07 NOTE — H&P PST ADULT - NEGATIVE NEUROLOGICAL SYMPTOMS
no paresthesias/no transient paralysis/no focal seizures/no weakness/no generalized seizures/no syncope

## 2018-09-07 NOTE — H&P PST ADULT - NEGATIVE ENMT SYMPTOMS
no throat pain/no vertigo/no hearing difficulty/no ear pain/no dysphagia/no tinnitus no nasal congestion/no vertigo/no throat pain/no dysphagia/no ear pain/no hearing difficulty/no tinnitus/no sinus symptoms/no nasal discharge

## 2018-09-07 NOTE — H&P PST ADULT - HISTORY OF PRESENT ILLNESS
42 year old Obese female with PMH Dm2 ( last HgA1c 7.2 9/2017), 9/15/2017-9/17 hospital stay w/ UTI/Hematuria/ found to have 1 cm stone ureteropelvic junction w/ left kidney hydronephrosis s/p left ureteral stent planned for cystoscopy, left ureteroscopy, laser lithotripsy, stone extraction, stent exchange. Pt is a 43 yr old female scheduled for Total Substernal Thyroid ectomy with Dr Lemon 9/19/18. Pt c/o of thyromegaly for at least 3 years and biopsy was inconclusive - pt now c/o of increased swelling and surgery recommended. Pt denies pain or dysphagia. Pt hx of kidney stones and DM - on oral med.

## 2018-09-07 NOTE — H&P PST ADULT - ENDOCRINE COMMENTS
Hx of DM - on oral med - 's - does not know A1c Hx of DM - on oral med - 's - does not know A1c. Thyromegaly for past 3 years and biopsy done that showed inconclusive cells - pt now to have surgery to remove

## 2018-09-07 NOTE — H&P PST ADULT - PROBLEM SELECTOR PLAN 1
Scheduled for Total Substernal Thyroidectomy with Dr Lemon 9/19/18   Preop instructions provided and patient verbalizes understanding.  Labs done and results pending.  Hibiclens and Famotidine provided with instructions.   OR Booking notified of KEENA precautions and DM   Pt to see PCP for [facility] for I&D of right groin boil and DM status - forms given

## 2018-09-07 NOTE — H&P PST ADULT - SKIN/BREAST COMMENTS
Pt c/o of small I&D of fluid filled pustule in right groin at [facility] 2 weeks ago - Pt c/o of small I&D of fluid filled boil in right groin at [facility] 2 weeks ago - followed by PCP - healing well

## 2018-09-07 NOTE — H&P PST ADULT - NEUROLOGICAL DETAILS
responds to verbal commands/deep reflexes intact/alert and oriented x 3/responds to pain/normal strength/sensation intact

## 2018-09-18 ENCOUNTER — TRANSCRIPTION ENCOUNTER (OUTPATIENT)
Age: 43
End: 2018-09-18

## 2018-09-18 NOTE — ASU PATIENT PROFILE, ADULT - TEACHING/LEARNING LEARNING PREFERENCES
MD  Medication Refill Request from DAVID     Concerta 36mg  (last refill 2/28/18)  (last visit 12/4/17)   verbal instruction

## 2018-09-19 ENCOUNTER — RESULT REVIEW (OUTPATIENT)
Age: 43
End: 2018-09-19

## 2018-09-19 ENCOUNTER — INPATIENT (INPATIENT)
Facility: HOSPITAL | Age: 43
LOS: 0 days | Discharge: HOME | End: 2018-09-20
Attending: SURGERY | Admitting: SURGERY
Payer: COMMERCIAL

## 2018-09-19 ENCOUNTER — APPOINTMENT (OUTPATIENT)
Dept: SURGERY | Facility: HOSPITAL | Age: 43
End: 2018-09-19

## 2018-09-19 VITALS
TEMPERATURE: 99 F | DIASTOLIC BLOOD PRESSURE: 74 MMHG | HEIGHT: 61 IN | OXYGEN SATURATION: 98 % | RESPIRATION RATE: 15 BRPM | WEIGHT: 223.99 LBS | SYSTOLIC BLOOD PRESSURE: 161 MMHG | HEART RATE: 76 BPM

## 2018-09-19 DIAGNOSIS — E04.9 NONTOXIC GOITER, UNSPECIFIED: ICD-10-CM

## 2018-09-19 DIAGNOSIS — Z98.89 OTHER SPECIFIED POSTPROCEDURAL STATES: Chronic | ICD-10-CM

## 2018-09-19 DIAGNOSIS — Z98.891 HISTORY OF UTERINE SCAR FROM PREVIOUS SURGERY: Chronic | ICD-10-CM

## 2018-09-19 DIAGNOSIS — Z98.890 OTHER SPECIFIED POSTPROCEDURAL STATES: Chronic | ICD-10-CM

## 2018-09-19 LAB
HCG UR QL: NEGATIVE — SIGNIFICANT CHANGE UP
RH IG SCN BLD-IMP: POSITIVE — SIGNIFICANT CHANGE UP

## 2018-09-19 PROCEDURE — 88307 TISSUE EXAM BY PATHOLOGIST: CPT | Mod: 26

## 2018-09-19 RX ORDER — METOPROLOL TARTRATE 50 MG
5 TABLET ORAL ONCE
Qty: 0 | Refills: 0 | Status: COMPLETED | OUTPATIENT
Start: 2018-09-19 | End: 2018-09-19

## 2018-09-19 RX ORDER — HYDROMORPHONE HYDROCHLORIDE 2 MG/ML
1 INJECTION INTRAMUSCULAR; INTRAVENOUS; SUBCUTANEOUS
Qty: 0 | Refills: 0 | Status: DISCONTINUED | OUTPATIENT
Start: 2018-09-19 | End: 2018-09-19

## 2018-09-19 RX ORDER — GLUCAGON INJECTION, SOLUTION 0.5 MG/.1ML
1 INJECTION, SOLUTION SUBCUTANEOUS ONCE
Qty: 0 | Refills: 0 | Status: DISCONTINUED | OUTPATIENT
Start: 2018-09-19 | End: 2018-09-20

## 2018-09-19 RX ORDER — DEXTROSE 50 % IN WATER 50 %
15 SYRINGE (ML) INTRAVENOUS ONCE
Qty: 0 | Refills: 0 | Status: DISCONTINUED | OUTPATIENT
Start: 2018-09-19 | End: 2018-09-20

## 2018-09-19 RX ORDER — HYDROMORPHONE HYDROCHLORIDE 2 MG/ML
0.5 INJECTION INTRAMUSCULAR; INTRAVENOUS; SUBCUTANEOUS
Qty: 0 | Refills: 0 | Status: DISCONTINUED | OUTPATIENT
Start: 2018-09-19 | End: 2018-09-19

## 2018-09-19 RX ORDER — DEXTROSE 50 % IN WATER 50 %
25 SYRINGE (ML) INTRAVENOUS ONCE
Qty: 0 | Refills: 0 | Status: DISCONTINUED | OUTPATIENT
Start: 2018-09-19 | End: 2018-09-20

## 2018-09-19 RX ORDER — ONDANSETRON 8 MG/1
4 TABLET, FILM COATED ORAL ONCE
Qty: 0 | Refills: 0 | Status: COMPLETED | OUTPATIENT
Start: 2018-09-19 | End: 2018-09-19

## 2018-09-19 RX ORDER — OXYCODONE HYDROCHLORIDE 5 MG/1
5 TABLET ORAL EVERY 6 HOURS
Qty: 0 | Refills: 0 | Status: DISCONTINUED | OUTPATIENT
Start: 2018-09-19 | End: 2018-09-20

## 2018-09-19 RX ORDER — METOCLOPRAMIDE HCL 10 MG
10 TABLET ORAL ONCE
Qty: 0 | Refills: 0 | Status: COMPLETED | OUTPATIENT
Start: 2018-09-19 | End: 2018-09-19

## 2018-09-19 RX ORDER — INSULIN LISPRO 100/ML
VIAL (ML) SUBCUTANEOUS AT BEDTIME
Qty: 0 | Refills: 0 | Status: DISCONTINUED | OUTPATIENT
Start: 2018-09-19 | End: 2018-09-20

## 2018-09-19 RX ORDER — IBUPROFEN 200 MG
200 TABLET ORAL THREE TIMES A DAY
Qty: 0 | Refills: 0 | Status: DISCONTINUED | OUTPATIENT
Start: 2018-09-19 | End: 2018-09-20

## 2018-09-19 RX ORDER — DEXTROSE 50 % IN WATER 50 %
12.5 SYRINGE (ML) INTRAVENOUS ONCE
Qty: 0 | Refills: 0 | Status: DISCONTINUED | OUTPATIENT
Start: 2018-09-19 | End: 2018-09-20

## 2018-09-19 RX ORDER — SODIUM CHLORIDE 9 MG/ML
1000 INJECTION, SOLUTION INTRAVENOUS
Qty: 0 | Refills: 0 | Status: DISCONTINUED | OUTPATIENT
Start: 2018-09-19 | End: 2018-09-19

## 2018-09-19 RX ORDER — SODIUM CHLORIDE 9 MG/ML
1000 INJECTION, SOLUTION INTRAVENOUS ONCE
Qty: 0 | Refills: 0 | Status: COMPLETED | OUTPATIENT
Start: 2018-09-19 | End: 2018-09-19

## 2018-09-19 RX ORDER — ACETAMINOPHEN 500 MG
650 TABLET ORAL EVERY 6 HOURS
Qty: 0 | Refills: 0 | Status: DISCONTINUED | OUTPATIENT
Start: 2018-09-19 | End: 2018-09-20

## 2018-09-19 RX ORDER — ACETAMINOPHEN 500 MG
1000 TABLET ORAL ONCE
Qty: 0 | Refills: 0 | Status: COMPLETED | OUTPATIENT
Start: 2018-09-19 | End: 2018-09-19

## 2018-09-19 RX ORDER — OXYCODONE HYDROCHLORIDE 5 MG/1
10 TABLET ORAL EVERY 6 HOURS
Qty: 0 | Refills: 0 | Status: DISCONTINUED | OUTPATIENT
Start: 2018-09-19 | End: 2018-09-20

## 2018-09-19 RX ORDER — INSULIN LISPRO 100/ML
VIAL (ML) SUBCUTANEOUS
Qty: 0 | Refills: 0 | Status: DISCONTINUED | OUTPATIENT
Start: 2018-09-19 | End: 2018-09-20

## 2018-09-19 RX ADMIN — Medication 200 MILLIGRAM(S): at 16:51

## 2018-09-19 RX ADMIN — SODIUM CHLORIDE 30 MILLILITER(S): 9 INJECTION, SOLUTION INTRAVENOUS at 12:18

## 2018-09-19 RX ADMIN — Medication 1000 MILLIGRAM(S): at 18:15

## 2018-09-19 RX ADMIN — Medication 400 MILLIGRAM(S): at 17:49

## 2018-09-19 RX ADMIN — Medication 10 MILLIGRAM(S): at 15:33

## 2018-09-19 RX ADMIN — Medication 200 MILLIGRAM(S): at 23:30

## 2018-09-19 RX ADMIN — ONDANSETRON 4 MILLIGRAM(S): 8 TABLET, FILM COATED ORAL at 13:51

## 2018-09-19 RX ADMIN — SODIUM CHLORIDE 1000 MILLILITER(S): 9 INJECTION, SOLUTION INTRAVENOUS at 13:04

## 2018-09-19 RX ADMIN — Medication 5 MILLIGRAM(S): at 16:45

## 2018-09-19 RX ADMIN — SODIUM CHLORIDE 100 MILLILITER(S): 9 INJECTION, SOLUTION INTRAVENOUS at 14:34

## 2018-09-19 RX ADMIN — SODIUM CHLORIDE 30 MILLILITER(S): 9 INJECTION, SOLUTION INTRAVENOUS at 12:10

## 2018-09-19 NOTE — ASU DISCHARGE PLAN (ADULT/PEDIATRIC). - MEDICATION SUMMARY - MEDICATIONS TO TAKE
I will START or STAY ON the medications listed below when I get home from the hospital:    oxyCODONE-acetaminophen 5 mg-325 mg oral tablet  -- 1 tab(s) by mouth every 6 hours, As Needed MDD:4  -- Caution federal law prohibits the transfer of this drug to any person other  than the person for whom it was prescribed.  May cause drowsiness.  Alcohol may intensify this effect.  Use care when operating dangerous machinery.  This prescription cannot be refilled.  This product contains acetaminophen.  Do not use  with any other product containing acetaminophen to prevent possible liver damage.  Using more of this medication than prescribed may cause serious breathing problems.    -- Indication: For post operative pain    metFORMIN 1000 mg oral tablet  -- 1 tab(s) by mouth 2 times a day  -- Indication: For diabetes    valACYclovir  -- 1000 milligram(s) orally  two times daily on first 3 days of menstruation monthly  -- Indication: For prevention    Multigen Plus oral tablet  -- 1 tab(s) by mouth once a day, am  -- Indication: For supplement    Vitamin D3 1000 intl units oral tablet  -- 1 tab(s) by mouth once a day, am  -- Indication: For supplement

## 2018-09-19 NOTE — BRIEF OPERATIVE NOTE - PROCEDURE
<<-----Click on this checkbox to enter Procedure Excision of right lobe of thyroid  09/19/2018    Active  [id number]  Isthmusectomy, thyroid  09/19/2018    Active  [email or web address]

## 2018-09-19 NOTE — ASU DISCHARGE PLAN (ADULT/PEDIATRIC). - SPECIAL INSTRUCTIONS
When you go home, please take two tylenol and one ibuprofen/motrin tab, three times a day with meals for the first three days. If you have intolerable pain, you may take percocet instead of tylenol, but please continue to take ibuprofen with meals.     Please follow up with Dr. Lemon in his office. Call to schedule appointment.

## 2018-09-19 NOTE — CHART NOTE - NSCHARTNOTEFT_GEN_A_CORE
D TEAM SURGERY POST-OPERATIVE NOTE    Subjective:  Patient is s/p thyroidectomy. Patient has felt intermittently nauseous since surgery, has not vomited. Denies chest pain, or SOB. Having pain at surgical site, tolerable with current pain regimen. Recovering appropriately.    Objective:  Vital Signs Last 24 Hrs  T(C): 36.8 (19 Sep 2018 18:00), Max: 37 (19 Sep 2018 06:47)  T(F): 98.2 (19 Sep 2018 18:00), Max: 98.6 (19 Sep 2018 06:47)  HR: 101 (19 Sep 2018 18:00) (76 - 137)  BP: 111/69 (19 Sep 2018 18:00) (97/49 - 161/74)  BP(mean): --  RR: 16 (19 Sep 2018 18:00) (10 - 22)  SpO2: 99% (19 Sep 2018 18:00) (93% - 100%)  I&O's Detail    19 Sep 2018 07:01  -  19 Sep 2018 18:15  --------------------------------------------------------  IN:    IV PiggyBack: 150 mL    Lactated Ringers IV Bolus: 1000 mL    lactated ringers.: 105 mL    lactated ringers.: 300 mL  Total IN: 1555 mL    OUT:    Voided: 600 mL  Total OUT: 600 mL    Total NET: 955 mL      PAST MEDICAL & SURGICAL HISTORY:  Thyroid nodule: s/p biopsy   Hematuria  Genital sore: recurrent w/Menstrual period, uses PRN Valcyclovir.  Type 2 diabetes mellitus without complication, unspecified long term insulin use status  Hernia: umbilical  Kidney stones  Morbid obesity due to excess calories  Chronic anemia  Incisional hernia  Vitamin B12 deficiency  DM (diabetes mellitus)  History of hernia repair: x 3  H/O: : x3  H/O abdominoplasty: 2015  History of incisional hernia repair: x 3  History of : x 3 (, , )    Physical Exam:  General: NAD, resting comfortably in bed, baseline voice  Pulmonary: Nonlabored breathing, no respiratory distress  Neck: dressing in place, c/d/i, no obvious signs of hematoma  Extremities: WWP    POCT Blood Glucose.: 126 mg/dL (19 Sep 2018 06:58)      Assessment:   43y Female, now several hours post-op from a thyroidectomy.     Plan:  - Regular diet  - Pain control as needed  - lovenox for DVT ppx  - OOB and ambulating as tolerated  - F/u AM labs

## 2018-09-19 NOTE — BRIEF OPERATIVE NOTE - OPERATION/FINDINGS
Large right substernal goiter, dissected free following division of R sternocleidomastoid, with resection of isthmus and pyramidal thyroid. Right superior parathyroid identified and left in situ. Left thyroid lobe normal, left in situ. R SCM approximated with chromic suture.

## 2018-09-20 ENCOUNTER — TRANSCRIPTION ENCOUNTER (OUTPATIENT)
Age: 43
End: 2018-09-20

## 2018-09-20 VITALS
SYSTOLIC BLOOD PRESSURE: 136 MMHG | OXYGEN SATURATION: 97 % | RESPIRATION RATE: 18 BRPM | TEMPERATURE: 98 F | HEART RATE: 84 BPM | DIASTOLIC BLOOD PRESSURE: 75 MMHG

## 2018-09-20 RX ORDER — ACETAMINOPHEN 500 MG
2 TABLET ORAL
Qty: 0 | Refills: 0 | DISCHARGE
Start: 2018-09-20

## 2018-09-20 RX ORDER — IBUPROFEN 200 MG
1 TABLET ORAL
Qty: 0 | Refills: 0 | DISCHARGE
Start: 2018-09-20

## 2018-09-20 RX ADMIN — Medication 200 MILLIGRAM(S): at 05:55

## 2018-09-20 RX ADMIN — Medication 200 MILLIGRAM(S): at 05:25

## 2018-09-20 RX ADMIN — Medication 200 MILLIGRAM(S): at 00:00

## 2018-09-20 RX ADMIN — Medication 200 MILLIGRAM(S): at 14:26

## 2018-09-20 RX ADMIN — Medication 650 MILLIGRAM(S): at 11:27

## 2018-09-20 RX ADMIN — Medication 650 MILLIGRAM(S): at 11:57

## 2018-09-20 RX ADMIN — Medication 200 MILLIGRAM(S): at 14:56

## 2018-09-20 NOTE — PROGRESS NOTE ADULT - SUBJECTIVE AND OBJECTIVE BOX
D Team Surgery Progress Note    SUBJECTIVE: Patient seen and examined at the bedside. No overnight events. Feeling well this morning. Tolerating clear liquids without nausea or vomiting, only had a small amount to drink this morning. Pain is well controlled. Has not been out of bed.    VITALS  T(C): 36.8 (09-20-18 @ 05:24), Max: 37 (09-19-18 @ 06:47)  HR: 84 (09-20-18 @ 05:24) (76 - 137)  BP: 105/60 (09-20-18 @ 05:24) (97/49 - 161/74)  RR: 18 (09-20-18 @ 05:24) (10 - 22)  SpO2: 96% (09-20-18 @ 05:24) (93% - 100%)  CAPILLARY BLOOD GLUCOSE      POCT Blood Glucose.: 159 mg/dL (19 Sep 2018 23:05)  POCT Blood Glucose.: 126 mg/dL (19 Sep 2018 06:58)    Is/Os    09-19 @ 07:01  -  09-20 @ 05:56  --------------------------------------------------------  IN:    IV PiggyBack: 150 mL    Lactated Ringers IV Bolus: 1000 mL    lactated ringers.: 105 mL    lactated ringers.: 400 mL    Oral Fluid: 50 mL  Total IN: 1705 mL    OUT:    Voided: 1400 mL  Total OUT: 1400 mL    Total NET: 305 mL      PHYSICAL EXAM:   General: NAD, Lying in bed comfortably, alert, oriented x3  Pulm: Non-labored breathing  Neck: dressing in place, c/d/i, no obvious signs of hematoma  Extremities: WWP    MEDICATIONS (STANDING): dextrose 50% Injectable 12.5 Gram(s) IV Push once  dextrose 50% Injectable 25 Gram(s) IV Push once  dextrose 50% Injectable 25 Gram(s) IV Push once  ibuprofen  Tablet. 200 milliGRAM(s) Oral three times a day  insulin lispro (HumaLOG) corrective regimen sliding scale   SubCutaneous three times a day before meals  insulin lispro (HumaLOG) corrective regimen sliding scale   SubCutaneous at bedtime    MEDICATIONS (PRN):acetaminophen   Tablet .. 650 milliGRAM(s) Oral every 6 hours PRN Mild Pain (1 - 3)  dextrose 40% Gel 15 Gram(s) Oral once PRN Blood Glucose LESS THAN 70 milliGRAM(s)/deciliter  glucagon  Injectable 1 milliGRAM(s) IntraMuscular once PRN Glucose LESS THAN 70 milligrams/deciliter  oxyCODONE    IR 5 milliGRAM(s) Oral every 6 hours PRN Moderate Pain (4 - 6)  oxyCODONE    IR 10 milliGRAM(s) Oral every 6 hours PRN Severe Pain (7 - 10)    LABS

## 2018-09-20 NOTE — DISCHARGE NOTE ADULT - MEDICATION SUMMARY - MEDICATIONS TO TAKE
I will START or STAY ON the medications listed below when I get home from the hospital:    ibuprofen 200 mg oral tablet  -- 1 tab(s) by mouth 3 times a day  -- Indication: For pain medication    acetaminophen 325 mg oral tablet  -- 2 tab(s) by mouth every 6 hours, As needed, Mild Pain (1 - 3)  -- Indication: For pain medication    metFORMIN 1000 mg oral tablet  -- 1 tab(s) by mouth 2 times a day  -- Indication: For diabetes medication    valACYclovir  -- 1000 milligram(s) orally  two times daily on first 3 days of menstruation monthly  -- Indication: For anti-infective medication    Multigen Plus oral tablet  -- 1 tab(s) by mouth once a day, am  -- Indication: For iron suppliment    Vitamin D3 1000 intl units oral tablet  -- 1 tab(s) by mouth once a day, am  -- Indication: For suppliment

## 2018-09-20 NOTE — DISCHARGE NOTE ADULT - NS AS ACTIVITY OBS
No Heavy lifting/straining/Sponge bath for now, ask about bathing instructions at follow up appointment

## 2018-09-20 NOTE — DISCHARGE NOTE ADULT - PLAN OF CARE
wound healing, Follow up final pathology report in Dr. Lemon's office wound healing, Follow up final pathology report in Dr. Lemon's office (please see below for phone number to make a follow up appointment)

## 2018-09-20 NOTE — DISCHARGE NOTE ADULT - PATIENT PORTAL LINK FT
You can access the [facility] Patient Portal, offered by Health system, by registering with the following website: [email or web address]

## 2018-09-20 NOTE — DISCHARGE NOTE ADULT - CARE PLAN
Principal Discharge DX:	Thyroid nodule  Goal:	wound healing, Follow up final pathology report in Dr. Lemon's office  Assessment and plan of treatment:	wound healing, Follow up final pathology report in Dr. Lemon's office (please see below for phone number to make a follow up appointment)  Secondary Diagnosis:	DM (diabetes mellitus)

## 2018-09-20 NOTE — PROGRESS NOTE ADULT - ASSESSMENT
43y Female, now several hours post-op from a thyroidectomy.     Plan:  - Regular diet  - Pain control as needed  - lovenox for DVT ppx  - OOB and ambulating as tolerated  - F/u AM labs.

## 2018-09-20 NOTE — DISCHARGE NOTE ADULT - HOSPITAL COURSE
Patient was admitted to [facility] for a scheduled surgery.    On 9/19/2018, patient went to the O.R. as planned and had the following operation:   Procedure:  Excision of right lobe of thyroid  Isthmusectomy, thyroid        · Operative Findings	Large right substernal goiter, dissected free following division of R sternocleidomastoid, with resection of isthmus and pyramidal thyroid. Right superior parathyroid identified and left in situ. Left thyroid lobe normal, left in situ. R SCM approximated with chromic suture.	    Patient tolerated the surgery well.  She is tolerating a regular diet, and pain is well controlled on oral pain medication.    Patient has been advised to schedule a follow up with Dr. Lemon in his office.

## 2018-09-28 ENCOUNTER — APPOINTMENT (OUTPATIENT)
Dept: SURGERY | Facility: CLINIC | Age: 43
End: 2018-09-28
Payer: COMMERCIAL

## 2018-09-28 DIAGNOSIS — J39.8 OTHER SPECIFIED DISEASES OF UPPER RESPIRATORY TRACT: ICD-10-CM

## 2018-09-28 PROCEDURE — 99024 POSTOP FOLLOW-UP VISIT: CPT

## 2018-10-05 ENCOUNTER — APPOINTMENT (OUTPATIENT)
Dept: SURGERY | Facility: CLINIC | Age: 43
End: 2018-10-05
Payer: COMMERCIAL

## 2018-10-05 PROCEDURE — 99024 POSTOP FOLLOW-UP VISIT: CPT

## 2018-10-10 ENCOUNTER — INBOUND DOCUMENT (OUTPATIENT)
Age: 43
End: 2018-10-10

## 2018-11-13 ENCOUNTER — APPOINTMENT (OUTPATIENT)
Dept: UROLOGY | Facility: CLINIC | Age: 43
End: 2018-11-13

## 2018-12-08 NOTE — ED PROVIDER NOTE - DISCHARGE DATE
24-Aug-2018 no abdominal pain, no bloating, no constipation, no diarrhea, no nausea and no vomiting.

## 2019-01-11 ENCOUNTER — APPOINTMENT (OUTPATIENT)
Dept: UROLOGY | Facility: CLINIC | Age: 44
End: 2019-01-11

## 2019-01-11 ENCOUNTER — APPOINTMENT (OUTPATIENT)
Dept: UROLOGY | Facility: CLINIC | Age: 44
End: 2019-01-11
Payer: COMMERCIAL

## 2019-01-11 VITALS
SYSTOLIC BLOOD PRESSURE: 142 MMHG | BODY MASS INDEX: 41.91 KG/M2 | TEMPERATURE: 98.5 F | DIASTOLIC BLOOD PRESSURE: 76 MMHG | HEIGHT: 61 IN | WEIGHT: 222 LBS

## 2019-01-11 DIAGNOSIS — Z86.69 PERSONAL HISTORY OF OTHER DISEASES OF THE NERVOUS SYSTEM AND SENSE ORGANS: ICD-10-CM

## 2019-01-11 DIAGNOSIS — Z83.3 FAMILY HISTORY OF DIABETES MELLITUS: ICD-10-CM

## 2019-01-11 DIAGNOSIS — Z86.2 PERSONAL HISTORY OF DISEASES OF THE BLOOD AND BLOOD-FORMING ORGANS AND CERTAIN DISORDERS INVOLVING THE IMMUNE MECHANISM: ICD-10-CM

## 2019-01-11 PROCEDURE — 99214 OFFICE O/P EST MOD 30 MIN: CPT

## 2019-01-11 NOTE — HISTORY OF PRESENT ILLNESS
[FreeTextEntry1] : Very pleasant 43-year-old woman who presents for followup of kidney stones and new complaints of urinary urgency and urge incontinence. She reports a urinary urgency and urge incontinence have been progressing over the last few months. She reports no hematuria. No dysuria. She reports generalized back pain but no flank pain. No nausea or vomiting. No specific time to her symptoms. No aggravating or relieving factors she knows of. She has not gotten ultrasound done yet. [Urinary Retention] : no urinary retention [Urinary Urgency] : no urinary urgency [Urinary Frequency] : no urinary frequency [Nocturia] : no nocturia [Straining] : no straining [Weak Stream] : no weak stream [Dysuria] : no dysuria [Hematuria - Gross] : no gross hematuria [Bladder Spasm] : no bladder spasm [Abdominal Pain] : no abdominal pain [Flank Pain] : no flank pain [Fever] : no fever [Fatigue] : no fatigue [Nausea] : no nausea [Anorexia] : no anorexia

## 2019-01-11 NOTE — ASSESSMENT
[FreeTextEntry1] : Very pleasant 43-year-old woman who presented for followup of kidney stones with new complaints of urinary urgency and urge incontinence\par -Renal ultrasound\par -Trial of Ditropan\par -I discussed the risks, benefits, alternatives, and possible side effects of Ditropan (oxybutynin) therapy with the patient, including but not limited to urinary retention, dry mouth, dry eyes, constipation, and confusion.  Patient understands that he must call immediately should any of these symptoms occur\par -Follow up in one month

## 2019-02-13 ENCOUNTER — APPOINTMENT (OUTPATIENT)
Dept: UROLOGY | Facility: CLINIC | Age: 44
End: 2019-02-13
Payer: COMMERCIAL

## 2019-02-13 DIAGNOSIS — E04.2 NONTOXIC MULTINODULAR GOITER: ICD-10-CM

## 2019-02-13 PROCEDURE — 99214 OFFICE O/P EST MOD 30 MIN: CPT

## 2019-02-13 NOTE — HISTORY OF PRESENT ILLNESS
[FreeTextEntry1] : Very pleasant 43-year-old woman who presents for followup of urinary urgency and history of kidney stones. Patient reports that she has been taking Ditropan as prescribed and reports a significant improvement in her urinary symptoms. She reports urgency is significantly reduced. She denies hematuria. She denies flank pain. She denies dysuria. Additionally, she underwent a recent renal ultrasound which demonstrated a 9 mm left intrarenal stone. Previous CT scan from July 2018 demonstrated a punctate left intrarenal stone. [Urinary Retention] : no urinary retention [Urinary Urgency] : no urinary urgency [Urinary Frequency] : no urinary frequency [Nocturia] : no nocturia [Straining] : no straining [Weak Stream] : no weak stream [Dysuria] : no dysuria [Hematuria - Gross] : no gross hematuria [Bladder Spasm] : no bladder spasm [Abdominal Pain] : no abdominal pain [Flank Pain] : no flank pain [Fever] : no fever [Fatigue] : no fatigue [Nausea] : no nausea [Anorexia] : no anorexia

## 2019-02-13 NOTE — ASSESSMENT
[FreeTextEntry1] : Very pleasant 43-year-old woman presents for followup of urinary urgency and kidney stones\par -Continue Ditropan-refill sent to pharmacy\par -Discussed options for management of a 9 mm intrarenal stone; we discussed option of observation versus CT scan for definitive examination of the stone. Patient would like to CT scan after discussing the risks and benefits of CT scans\par -Renal ultrasound images reviewed with the patient

## 2019-03-06 ENCOUNTER — OUTPATIENT (OUTPATIENT)
Dept: OUTPATIENT SERVICES | Facility: HOSPITAL | Age: 44
LOS: 1 days | End: 2019-03-06
Payer: COMMERCIAL

## 2019-03-06 ENCOUNTER — APPOINTMENT (OUTPATIENT)
Dept: CT IMAGING | Facility: HOSPITAL | Age: 44
End: 2019-03-06
Payer: COMMERCIAL

## 2019-03-06 DIAGNOSIS — Z98.890 OTHER SPECIFIED POSTPROCEDURAL STATES: Chronic | ICD-10-CM

## 2019-03-06 DIAGNOSIS — Z98.89 OTHER SPECIFIED POSTPROCEDURAL STATES: Chronic | ICD-10-CM

## 2019-03-06 DIAGNOSIS — N20.0 CALCULUS OF KIDNEY: ICD-10-CM

## 2019-03-06 DIAGNOSIS — Z98.891 HISTORY OF UTERINE SCAR FROM PREVIOUS SURGERY: Chronic | ICD-10-CM

## 2019-03-06 PROCEDURE — 74176 CT ABD & PELVIS W/O CONTRAST: CPT | Mod: 26

## 2019-03-06 PROCEDURE — 74176 CT ABD & PELVIS W/O CONTRAST: CPT

## 2019-03-13 ENCOUNTER — APPOINTMENT (OUTPATIENT)
Dept: UROLOGY | Facility: CLINIC | Age: 44
End: 2019-03-13
Payer: COMMERCIAL

## 2019-03-13 VITALS
SYSTOLIC BLOOD PRESSURE: 137 MMHG | BODY MASS INDEX: 42.1 KG/M2 | TEMPERATURE: 97.7 F | HEIGHT: 61 IN | DIASTOLIC BLOOD PRESSURE: 83 MMHG | HEART RATE: 81 BPM | WEIGHT: 223 LBS

## 2019-03-13 PROCEDURE — 99213 OFFICE O/P EST LOW 20 MIN: CPT

## 2019-03-13 RX ORDER — LANCETS
EACH MISCELLANEOUS
Qty: 100 | Refills: 0 | Status: ACTIVE | COMMUNITY
Start: 2018-09-10

## 2019-03-13 RX ORDER — BLOOD SUGAR DIAGNOSTIC
STRIP MISCELLANEOUS
Qty: 100 | Refills: 0 | Status: ACTIVE | COMMUNITY
Start: 2018-09-10

## 2019-03-13 RX ORDER — MOMETASONE FUROATE 1 MG/G
0.1 OINTMENT TOPICAL
Qty: 45 | Refills: 0 | Status: ACTIVE | COMMUNITY
Start: 2018-12-03

## 2019-03-13 RX ORDER — HYDROCORTISONE 10 MG/G
1 CREAM TOPICAL
Qty: 28 | Refills: 0 | Status: ACTIVE | COMMUNITY
Start: 2018-10-30

## 2019-03-13 RX ORDER — CICLOPIROX OLAMINE 7.7 MG/G
0.77 CREAM TOPICAL
Qty: 90 | Refills: 0 | Status: ACTIVE | COMMUNITY
Start: 2018-12-03

## 2019-03-13 RX ORDER — LOSARTAN POTASSIUM 25 MG/1
25 TABLET, FILM COATED ORAL
Qty: 90 | Refills: 0 | Status: ACTIVE | COMMUNITY
Start: 2018-09-10

## 2019-03-13 RX ORDER — GLIMEPIRIDE 1 MG/1
1 TABLET ORAL
Qty: 90 | Refills: 0 | Status: ACTIVE | COMMUNITY
Start: 2019-03-12

## 2019-03-13 RX ORDER — OMEPRAZOLE 40 MG/1
40 CAPSULE, DELAYED RELEASE ORAL
Qty: 30 | Refills: 0 | Status: ACTIVE | COMMUNITY
Start: 2018-10-30

## 2019-03-13 NOTE — HISTORY OF PRESENT ILLNESS
[FreeTextEntry1] : Very pleasant 43-year-old woman who presents for followup of kidney stones. She recently underwent a CT scan which demonstrated no stones, but did demonstrate cortical calcifications. She denies flank pain. No hematuria. No dysuria. No other complaints. [Urinary Retention] : no urinary retention [Urinary Urgency] : no urinary urgency [Urinary Frequency] : no urinary frequency [Nocturia] : no nocturia [Straining] : no straining [Weak Stream] : no weak stream [Dysuria] : no dysuria [Hematuria - Gross] : no gross hematuria [Bladder Spasm] : no bladder spasm [Abdominal Pain] : no abdominal pain [Flank Pain] : no flank pain [Fever] : no fever [Fatigue] : no fatigue [Nausea] : no nausea [Anorexia] : no anorexia

## 2019-03-13 NOTE — ASSESSMENT
[FreeTextEntry1] : Very pleasant 43-year-old woman presents for followup of kidney stones\par -CT images reviewed with patient demonstrating no evidence of kidney stones, only cortical calcifications\par -Discussed general preventative strategies for kidney stones\par -Follow up in 6 months\par -Renal ultrasound in one year

## 2019-04-19 ENCOUNTER — APPOINTMENT (OUTPATIENT)
Dept: SURGERY | Facility: CLINIC | Age: 44
End: 2019-04-19

## 2019-05-02 ENCOUNTER — MEDICATION RENEWAL (OUTPATIENT)
Age: 44
End: 2019-05-02

## 2019-08-13 NOTE — ED ADULT TRIAGE NOTE - TEMPERATURE IN CELSIUS (DEGREES C)
Subjective   Choco Bryant is a 52 y.o. male.     Pt is here for med check depression  Last visit, started vraylar  And does feel better on that  Still tired  Sleeping about 7.5 hours per night now         The following portions of the patient's history were reviewed and updated as appropriate: allergies, current medications, past family history, past medical history, past social history, past surgical history and problem list.    Review of Systems   Constitutional: Positive for fatigue. Negative for fever.   HENT: Negative for congestion, ear pain, rhinorrhea and sore throat.    Eyes: Negative for blurred vision and itching.   Respiratory: Negative for cough and shortness of breath.    Cardiovascular: Negative for chest pain and palpitations.   Gastrointestinal: Negative for abdominal pain, diarrhea and vomiting.   Endocrine: Negative for polydipsia and polyuria.   Genitourinary: Negative for dysuria, frequency, hematuria and urgency.   Musculoskeletal: Negative for joint swelling and myalgias.   Skin: Negative for rash and skin lesions.   Neurological: Negative for dizziness, numbness and headache.   Psychiatric/Behavioral: Positive for decreased concentration, sleep disturbance, depressed mood and stress. The patient is not nervous/anxious.          Current Outpatient Medications:   •  albuterol sulfate HFA (PROAIR HFA) 108 (90 Base) MCG/ACT inhaler, INL 2 PFS PO Q 4 H FOR UP TO 3 DAYS PRF WHZ OR SOB, Disp: , Rfl:   •  amphetamine-dextroamphetamine (ADDERALL) 10 MG tablet, Take 1 tablet by mouth Daily., Disp: 30 tablet, Rfl: 0  •  amphetamine-dextroamphetamine XR (ADDERALL XR) 30 MG 24 hr capsule, Take 1 capsule by mouth Every Morning, Disp: 30 capsule, Rfl: 0  •  Cariprazine HCl (VRAYLAR) 1.5 MG capsule capsule, Take 1 capsule by mouth Daily., Disp: 90 capsule, Rfl: 3  •  clonazePAM (KlonoPIN) 2 MG tablet, Take 1 tablet by mouth At Night As Needed for Anxiety (insomnia)., Disp: 30 tablet, Rfl: 1  •  FLONASE  "ALLERGY RELIEF 50 MCG/ACT nasal spray, USE 2 SPRAYS IN EACH NOSTRIL QD, Disp: , Rfl: 2  •  gabapentin (NEURONTIN) 100 MG capsule, TAKE 1 CAPSULE BY MOUTH THREE TIMES DAILY, Disp: 270 capsule, Rfl: 0  •  losartan (COZAAR) 100 MG tablet, Daily., Disp: , Rfl:   •  LOTEMAX 0.5 % gel ophthalmic gel, APPLY 1 DROP IN THE EYE 2 TIMES EVERY DAY, Disp: , Rfl: 3  •  Omeprazole ([facility] OMEPRAZOLE) 20 MG tablet delayed-release, [facility]S OMEPRAZOLE 20 MG TBEC, Disp: , Rfl:   •  pravastatin (PRAVACHOL) 40 MG tablet, Take 1 tablet by mouth Daily., Disp: 90 tablet, Rfl: 0  •  tamsulosin (FLOMAX) 0.4 MG capsule 24 hr capsule, 1 capsule Daily., Disp: , Rfl:   •  Vortioxetine HBr (TRINTELLIX) 20 MG tablet, Take 20 mg by mouth Daily., Disp: 90 tablet, Rfl: 1    Objective   /84 (BP Location: Right arm, Patient Position: Sitting, Cuff Size: Adult)   Pulse 85   Temp 98 °F (36.7 °C) (Oral)   Resp 20   Ht 179.1 cm (70.5\")   Wt 113 kg (248 lb 9.6 oz)   SpO2 98%   BMI 35.17 kg/m²   Physical Exam   Constitutional: He is oriented to person, place, and time. He appears well-developed and well-nourished. No distress.   HENT:   Head: Normocephalic and atraumatic.   Right Ear: External ear normal.   Left Ear: External ear normal.   Mouth/Throat: Oropharynx is clear and moist. No oropharyngeal exudate.   Eyes: Conjunctivae and EOM are normal. Right eye exhibits no discharge. Left eye exhibits no discharge. No scleral icterus.   Neck: Normal range of motion. Neck supple. No thyromegaly present.   Cardiovascular: Normal rate, regular rhythm and normal heart sounds. Exam reveals no gallop and no friction rub.   No murmur heard.  Pulmonary/Chest: Effort normal and breath sounds normal. No respiratory distress. He has no wheezes. He has no rales.   Musculoskeletal: Normal range of motion. He exhibits no edema or deformity.   Lymphadenopathy:     He has no cervical adenopathy.   Neurological: He is alert and oriented to person, place, and time. No " cranial nerve deficit.   Skin: Skin is warm and dry. No rash noted. He is not diaphoretic. No erythema.   Psychiatric: He has a normal mood and affect. His behavior is normal. Thought content normal.   Vitals reviewed.        Assessment/Plan   Problems Addressed this Visit        Other    Depression - Primary    Relevant Medications    Cariprazine HCl (VRAYLAR) 1.5 MG capsule capsule        Doing better on vraylar  Continue on 1.5mg for now           Procedures   37.6

## 2019-09-02 NOTE — PHYSICAL EXAM
Pt [place] a cheeseburger [place] funnel [place] and has vomited approx 6 times since this and has abdominal cramping.    [General Appearance - Well Developed] : well developed [General Appearance - Well Nourished] : well nourished [Normal Appearance] : normal appearance [Well Groomed] : well groomed [General Appearance - In No Acute Distress] : no acute distress [Abdomen Soft] : soft [Abdomen Tenderness] : non-tender [Costovertebral Angle Tenderness] : no ~M costovertebral angle tenderness [Urinary Bladder Findings] : the bladder was normal on palpation [Edema] : no peripheral edema [] : no respiratory distress [Respiration, Rhythm And Depth] : normal respiratory rhythm and effort [Exaggerated Use Of Accessory Muscles For Inspiration] : no accessory muscle use [Oriented To Time, Place, And Person] : oriented to person, place, and time [Affect] : the affect was normal [Mood] : the mood was normal [Not Anxious] : not anxious [Normal Station and Gait] : the gait and station were normal for the patient's age [No Focal Deficits] : no focal deficits [No Palpable Adenopathy] : no palpable adenopathy

## 2019-09-17 ENCOUNTER — APPOINTMENT (OUTPATIENT)
Dept: UROLOGY | Facility: CLINIC | Age: 44
End: 2019-09-17
Payer: COMMERCIAL

## 2019-09-17 DIAGNOSIS — R30.0 DYSURIA: ICD-10-CM

## 2019-09-17 PROCEDURE — 99214 OFFICE O/P EST MOD 30 MIN: CPT

## 2019-09-17 RX ORDER — OXYBUTYNIN CHLORIDE 10 MG/1
10 TABLET, EXTENDED RELEASE ORAL
Qty: 90 | Refills: 3 | Status: DISCONTINUED | COMMUNITY
Start: 2019-01-11 | End: 2019-09-17

## 2019-09-17 NOTE — HISTORY OF PRESENT ILLNESS
[FreeTextEntry1] : Very pleasant 44-year-old woman who presents for followup of kidney stones, urinary urgency, new complaint of dysuria. Patient reports that dysuria started approximately one week ago. She denies flank pain or suprapubic pain. No hematuria. No fevers or chills. She reports that over the same time. Her urinary urgency became worse. Prior to this she reports that Ditropan had significantly improved her symptoms of urinary urgency. Patient reports mild back pain but no flank pain. She reports that this is intermittent are no specific timing to her symptoms. No aggravating or alleviating factors that she knows of. [Urinary Retention] : no urinary retention [Urinary Urgency] : urinary urgency [Urinary Frequency] : no urinary frequency [Nocturia] : no nocturia [Straining] : no straining [Weak Stream] : no weak stream [Dysuria] : dysuria [Hematuria - Gross] : no gross hematuria [Bladder Spasm] : no bladder spasm [Abdominal Pain] : no abdominal pain [Flank Pain] : no flank pain [Fever] : no fever [Fatigue] : no fatigue [Nausea] : no nausea [Anorexia] : no anorexia

## 2019-09-17 NOTE — ASSESSMENT
[FreeTextEntry1] : Very pleasant 44-year-old woman who presents for followup of kidney stones, urinary urgency, dysuria\par -Renal ultrasound in 6 months for surveillance of kidney stones\par -Switch from Ditropan to VESIcare given decreased efficacy of the medication\par -I discussed the risks, benefits, alternatives, and possible side effects of Vesicare therapy with the patient, including but not limited to urinary retention, dry mouth, dry eyes, constipation, and confusion.  Patient understands that he must call immediately should any of these symptoms occur\par -Urine culture\par -Will call with results of urine culture\par -Follow up in 6 months

## 2019-09-20 LAB — BACTERIA UR CULT: NORMAL

## 2019-12-06 ENCOUNTER — APPOINTMENT (OUTPATIENT)
Dept: PULMONOLOGY | Facility: CLINIC | Age: 44
End: 2019-12-06
Payer: COMMERCIAL

## 2019-12-06 VITALS
SYSTOLIC BLOOD PRESSURE: 133 MMHG | TEMPERATURE: 97.8 F | OXYGEN SATURATION: 96 % | DIASTOLIC BLOOD PRESSURE: 89 MMHG | WEIGHT: 228 LBS | BODY MASS INDEX: 41.96 KG/M2 | HEART RATE: 99 BPM | HEIGHT: 62 IN | RESPIRATION RATE: 16 BRPM

## 2019-12-06 PROCEDURE — 94060 EVALUATION OF WHEEZING: CPT

## 2019-12-06 PROCEDURE — 94729 DIFFUSING CAPACITY: CPT

## 2019-12-06 PROCEDURE — 94727 GAS DIL/WSHOT DETER LNG VOL: CPT

## 2019-12-06 PROCEDURE — 99203 OFFICE O/P NEW LOW 30 MIN: CPT | Mod: 25

## 2019-12-08 NOTE — PHYSICAL EXAM
[Bowel Sounds] : normal bowel sounds [Auscultation Breath Sounds / Voice Sounds] : lungs were clear to auscultation bilaterally [Abdomen Soft] : soft [Abdomen Tenderness] : non-tender [General Appearance - Well Developed] : well developed [General Appearance - Well Nourished] : well nourished [No Deformities] : no deformities [Low Lying Soft Palate] : low lying soft palate [Enlarged Base of the Tongue] : enlargement of the base of the tongue [Jugular Venous Distention Increased] : there was no jugular-venous distention [] : the neck was supple [Thyroid Diffuse Enlargement] : the thyroid was not enlarged [Heart Rate And Rhythm] : heart rate and rhythm were normal [Heart Sounds] : normal S1 and S2 [Arterial Pulses Normal] : the arterial pulses were normal [Nail Clubbing] : no clubbing of the fingernails [Murmurs] : no murmurs present [Cyanosis, Localized] : no localized cyanosis [Motor Exam] : the motor exam was normal [Mood] : the mood was normal [Affect] : the affect was normal [Normal Oropharynx] : abnormal oropharynx [Elongated Uvula] : no elongated uvula [FreeTextEntry1] : no edema

## 2019-12-08 NOTE — HISTORY OF PRESENT ILLNESS
[FreeTextEntry1] : 44 year old woman who has had resection of thyroid goiter last year.  She reports that she has been told of snoring.  She awakens many times at night.  She has poor sleep, dry mouth in morning.  She has daytime somnolence. \par \par \par \par \par PSH\par \par section x 3 tummy tuck ventral hernia\par \par PMH\par \par DM type 2\par hematuria, kidney stone treated with with cystoscopy and ureteral stent\par \par She has right kidney stone currently\par \par HTN\par \par hypo D\par \par hyp B12\par \par SH\par \par never smoked\par no ETOH\par \par ALLERGY\par \par amoxicillin: fever\par \par no seasonal\par \par \par

## 2019-12-08 NOTE — REVIEW OF SYSTEMS
[As Noted in HPI] : as noted in HPI [Fever] : fever [Dyspnea] : dyspnea [Hypertension] : ~T hypertension [Nasal Congestion] : no nasal congestion [Cough] : no cough [Reflux] : no reflux [Heartburn] : no heartburn [FreeTextEntry2] : nasal congestion

## 2019-12-08 NOTE — REASON FOR VISIT
[Consultation] : a consultation visit [FreeTextEntry1] : snoring, dry throat history of thyroid goiter

## 2020-01-03 ENCOUNTER — MESSAGE (OUTPATIENT)
Age: 45
End: 2020-01-03

## 2020-01-22 ENCOUNTER — APPOINTMENT (OUTPATIENT)
Dept: UROLOGY | Facility: CLINIC | Age: 45
End: 2020-01-22
Payer: COMMERCIAL

## 2020-01-22 DIAGNOSIS — R31.29 OTHER MICROSCOPIC HEMATURIA: ICD-10-CM

## 2020-01-22 PROCEDURE — 76775 US EXAM ABDO BACK WALL LIM: CPT

## 2020-01-22 PROCEDURE — 99214 OFFICE O/P EST MOD 30 MIN: CPT | Mod: 25

## 2020-01-22 RX ORDER — SOLIFENACIN SUCCINATE 10 MG/1
10 TABLET ORAL
Qty: 90 | Refills: 1 | Status: DISCONTINUED | COMMUNITY
Start: 2019-09-17 | End: 2020-01-22

## 2020-01-22 NOTE — HISTORY OF PRESENT ILLNESS
[FreeTextEntry1] : Very pleasant 44-year-old woman presents for followup of kidney stones and urinary urgency. Patient underwent renal ultrasound today which demonstrated renal cysts that are stable from prior imaging but no kidney stones. She denies flank pain. No hematuria. No dysuria. She does report that her urinary urgency improved on VESIcare, however now she reports a slow urinary stream at the end of urination. No specific timing to her symptoms. No aggravating or alleviating factors that she knows of. [Urinary Retention] : no urinary retention [Urinary Urgency] : urinary urgency [Urinary Frequency] : no urinary frequency [Nocturia] : no nocturia [Weak Stream] : no weak stream [Dysuria] : dysuria [Straining] : no straining [Bladder Spasm] : no bladder spasm [Hematuria - Gross] : no gross hematuria [Abdominal Pain] : no abdominal pain [Flank Pain] : no flank pain [Fever] : no fever [Fatigue] : no fatigue [Anorexia] : no anorexia [Nausea] : no nausea

## 2020-01-22 NOTE — ASSESSMENT
[FreeTextEntry1] : Very pleasant 44 year-old woman with urinary urgency, renal cysts, kidney stones\par -Ultrasound images reviewed with the patient\par -Prior labs reviewed\par -Decrease dose of VESIcare to 5 mg daily\par -Follow up in 2 months

## 2020-01-22 NOTE — PHYSICAL EXAM
[General Appearance - Well Developed] : well developed [Well Groomed] : well groomed [General Appearance - Well Nourished] : well nourished [Normal Appearance] : normal appearance [Abdomen Soft] : soft [General Appearance - In No Acute Distress] : no acute distress [Abdomen Tenderness] : non-tender [Costovertebral Angle Tenderness] : no ~M costovertebral angle tenderness [Edema] : no peripheral edema [Urinary Bladder Findings] : the bladder was normal on palpation [] : no respiratory distress [Exaggerated Use Of Accessory Muscles For Inspiration] : no accessory muscle use [Respiration, Rhythm And Depth] : normal respiratory rhythm and effort [Affect] : the affect was normal [Oriented To Time, Place, And Person] : oriented to person, place, and time [Mood] : the mood was normal [Not Anxious] : not anxious [Normal Station and Gait] : the gait and station were normal for the patient's age [No Palpable Adenopathy] : no palpable adenopathy [No Focal Deficits] : no focal deficits

## 2020-02-07 ENCOUNTER — APPOINTMENT (OUTPATIENT)
Dept: PULMONOLOGY | Facility: CLINIC | Age: 45
End: 2020-02-07
Payer: COMMERCIAL

## 2020-02-07 VITALS
HEART RATE: 83 BPM | HEIGHT: 62 IN | SYSTOLIC BLOOD PRESSURE: 133 MMHG | OXYGEN SATURATION: 95 % | DIASTOLIC BLOOD PRESSURE: 81 MMHG | RESPIRATION RATE: 16 BRPM | TEMPERATURE: 97.9 F | WEIGHT: 226 LBS | BODY MASS INDEX: 41.59 KG/M2

## 2020-02-07 DIAGNOSIS — G47.9 SLEEP DISORDER, UNSPECIFIED: ICD-10-CM

## 2020-02-07 PROCEDURE — 99213 OFFICE O/P EST LOW 20 MIN: CPT

## 2020-02-07 NOTE — DISCUSSION/SUMMARY
[FreeTextEntry1] : Symptoms suggesting sleep apnea\par \par PLAN\par \par I will contact sleep center and have them contact patient.  Patient is to call me if she is not contacted\par \par patient is advised to continue precautions including avoidance of activities requiring close vigilance such as driving if she is sleepy, avoid supine posture, avoid sedatives, elevate head of bed.\par \par Eliezer Camejo MD [facility]

## 2020-02-07 NOTE — PHYSICAL EXAM
[No Acute Distress] : no acute distress [III] : Mallampati Class: III [Clear to Auscultation Bilaterally] : clear to auscultation bilaterally [No Neck Mass] : no neck mass [No Resp Distress] : no resp distress [No Abnormalities] : no abnormalities [No HSM] : no hsm [Benign] : benign [No Edema] : no edema [TextBox_44] : thick

## 2020-02-07 NOTE — HISTORY OF PRESENT ILLNESS
[TextBox_4] : 44 year old woman who has had resection of thyroid goiter last year. She reports that she has been told of snoring. She awakens many times at night. She has poor sleep, dry mouth in morning. She has daytime somnolence. \par Last visit, sleep study was ordered.  However, she has not had sleep study yet.  She states she was not contacted by sleep center. Symptoms are unimproved.  She has no dyspnea.\par \par \par \par PSH\par \par section x 3 tummy tuck ventral hernia\par \par PMH\par \par DM type 2\par hematuria, kidney stone treated with with cystoscopy and ureteral stent\par \par She has right kidney stone currently\par \par HTN\par \par hypo D\par \par hyp B12\par \par SH\par \par never smoked\par no ETOH\par \par ALLERGY\par \par amoxicillin: fever\par \par no seasonal\par

## 2020-02-07 NOTE — REVIEW OF SYSTEMS
[Fever] : no fever [Cough] : no cough [SOB on Exertion] : no sob on exertion [A.M. Dry Mouth] : a.m. dry mouth

## 2020-04-10 ENCOUNTER — APPOINTMENT (OUTPATIENT)
Dept: PULMONOLOGY | Facility: CLINIC | Age: 45
End: 2020-04-10

## 2020-05-13 NOTE — PROVIDER CONTACT NOTE (OTHER) - BACKGROUND
Pt admitted for fever, pain, and dysuria
Pt admitted w/ fever, pain, dysuria
admitted for fevers
pt with fevers,
Pt admitted for fever, pain, and dysuria
Forceps were not used

## 2020-06-02 ENCOUNTER — APPOINTMENT (OUTPATIENT)
Dept: UROLOGY | Facility: CLINIC | Age: 45
End: 2020-06-02

## 2020-07-08 ENCOUNTER — RESULT REVIEW (OUTPATIENT)
Age: 45
End: 2020-07-08

## 2020-07-22 ENCOUNTER — RESULT REVIEW (OUTPATIENT)
Age: 45
End: 2020-07-22

## 2020-09-25 ENCOUNTER — APPOINTMENT (OUTPATIENT)
Dept: UROLOGY | Facility: CLINIC | Age: 45
End: 2020-09-25
Payer: COMMERCIAL

## 2020-09-25 VITALS — DIASTOLIC BLOOD PRESSURE: 86 MMHG | RESPIRATION RATE: 16 BRPM | SYSTOLIC BLOOD PRESSURE: 137 MMHG | HEART RATE: 86 BPM

## 2020-09-25 PROCEDURE — 99213 OFFICE O/P EST LOW 20 MIN: CPT

## 2020-09-25 RX ORDER — ATORVASTATIN CALCIUM 80 MG/1
TABLET, FILM COATED ORAL
Refills: 0 | Status: ACTIVE | COMMUNITY

## 2020-09-25 RX ORDER — SEMAGLUTIDE 1.34 MG/ML
2 INJECTION, SOLUTION SUBCUTANEOUS
Refills: 0 | Status: ACTIVE | COMMUNITY

## 2020-09-25 NOTE — HISTORY OF PRESENT ILLNESS
[FreeTextEntry1] : Very pleasant 45-year-old woman presents for follow-up of history of kidney stones.  She feels well.  She reports that she fell recently on her steps at home and and fell on her back.  She now reports mild bilateral flank pain.  She denies hematuria.  No dysuria.  She does not feel the same sensation as when she was passing kidney stones in the past.  No other complaints.\par \par She also reports that she stopped using Vesicare as she would like to try a trial off of the medication.  She stopped this approximately 1 month ago and has not noticed a significant worsening of her symptoms. [Urinary Retention] : no urinary retention [Urinary Urgency] : no urinary urgency [Urinary Frequency] : no urinary frequency [Nocturia] : no nocturia [Straining] : no straining [Weak Stream] : no weak stream [Dysuria] : dysuria [Hematuria - Gross] : no gross hematuria [Bladder Spasm] : no bladder spasm [Abdominal Pain] : no abdominal pain [Flank Pain] : no flank pain [Fever] : no fever [Fatigue] : no fatigue [Nausea] : no nausea [Anorexia] : no anorexia

## 2020-09-25 NOTE — ASSESSMENT
[FreeTextEntry1] : Very pleasant 45-year-old woman who presents for follow-up of kidney stones, urinary urgency\par -Stop Vesicare for a trial off of the medication\par -Prior labs reviewed\par -Renal ultrasound for surveillance of stones and follow-up at that time

## 2020-12-04 ENCOUNTER — APPOINTMENT (OUTPATIENT)
Dept: UROLOGY | Facility: CLINIC | Age: 45
End: 2020-12-04
Payer: COMMERCIAL

## 2020-12-04 PROCEDURE — 99072 ADDL SUPL MATRL&STAF TM PHE: CPT

## 2020-12-04 PROCEDURE — 99213 OFFICE O/P EST LOW 20 MIN: CPT | Mod: 25

## 2020-12-04 PROCEDURE — 76775 US EXAM ABDO BACK WALL LIM: CPT

## 2020-12-04 NOTE — HISTORY OF PRESENT ILLNESS
[FreeTextEntry1] : Pleasant 45-year-old woman presents for follow-up of kidney stones and renal cyst.  She feels well.  No flank pain or suprapubic pain.  No nausea or vomiting.  She underwent a renal ultrasound today which demonstrated a number of small bilateral renal cysts without evidence of stones or hydronephrosis.  She denies dysuria.  No other complaints. [Urinary Retention] : no urinary retention [Urinary Urgency] : no urinary urgency [Urinary Frequency] : no urinary frequency [Nocturia] : no nocturia [Straining] : no straining [Weak Stream] : no weak stream [Dysuria] : no dysuria [Hematuria - Gross] : no gross hematuria [Bladder Spasm] : no bladder spasm [Abdominal Pain] : no abdominal pain [Flank Pain] : no flank pain [Fever] : no fever [Fatigue] : no fatigue [Nausea] : no nausea [Anorexia] : no anorexia

## 2020-12-04 NOTE — ASSESSMENT
[FreeTextEntry1] : Very pleasant 45-year-old woman who presents for follow-up of kidney stones and renal cysts\par -Ultrasound images reviewed with the patient\par -We discussed the benign nature of renal cysts\par -Follow-up in 1 year for surveillance of kidney stones

## 2021-01-01 NOTE — PATIENT PROFILE ADULT. - ANESTHESIA, PREVIOUS REACTION, PROFILE
none Site: Sternum (17 Aug 2021 21:21)  Bilirubin: 6.8 (17 Aug 2021 21:21)  Site: Sternum (17 Aug 2021 06:35)  Bilirubin: 3.4 (17 Aug 2021 06:35)

## 2021-04-07 NOTE — H&P PST ADULT - BREASTS DETAILS
Tried to call patient with his test results. Voice mail box is full and unable to leave a message. normal shape

## 2021-06-09 NOTE — ED PROVIDER NOTE - CROS ED CARDIOVAS ALL NEG
HSAT performed for potential sleep disordered breathing. Study classified as failed due to limited recorded data. Recommendation: Repeat HSAT. Sleep technologist: Please advise patient of HSAT results. Order has been entered for repeat study. negative...

## 2021-10-18 ENCOUNTER — RESULT REVIEW (OUTPATIENT)
Age: 46
End: 2021-10-18

## 2021-12-10 ENCOUNTER — APPOINTMENT (OUTPATIENT)
Dept: UROLOGY | Facility: CLINIC | Age: 46
End: 2021-12-10

## 2021-12-21 ENCOUNTER — EMERGENCY (EMERGENCY)
Facility: HOSPITAL | Age: 46
LOS: 1 days | Discharge: HOME | End: 2021-12-21
Attending: EMERGENCY MEDICINE
Payer: COMMERCIAL

## 2021-12-21 VITALS
HEART RATE: 88 BPM | WEIGHT: 214.95 LBS | SYSTOLIC BLOOD PRESSURE: 148 MMHG | HEIGHT: 61 IN | DIASTOLIC BLOOD PRESSURE: 81 MMHG | OXYGEN SATURATION: 96 % | RESPIRATION RATE: 17 BRPM | TEMPERATURE: 98 F

## 2021-12-21 VITALS
OXYGEN SATURATION: 97 % | SYSTOLIC BLOOD PRESSURE: 131 MMHG | DIASTOLIC BLOOD PRESSURE: 71 MMHG | TEMPERATURE: 98 F | HEART RATE: 74 BPM | RESPIRATION RATE: 16 BRPM

## 2021-12-21 DIAGNOSIS — Z98.89 OTHER SPECIFIED POSTPROCEDURAL STATES: Chronic | ICD-10-CM

## 2021-12-21 DIAGNOSIS — Z98.890 OTHER SPECIFIED POSTPROCEDURAL STATES: Chronic | ICD-10-CM

## 2021-12-21 DIAGNOSIS — Z98.891 HISTORY OF UTERINE SCAR FROM PREVIOUS SURGERY: Chronic | ICD-10-CM

## 2021-12-21 LAB
ALBUMIN SERPL ELPH-MCNC: 3.6 G/DL — SIGNIFICANT CHANGE UP (ref 3.5–5)
ALP SERPL-CCNC: 73 U/L — SIGNIFICANT CHANGE UP (ref 40–120)
ALT FLD-CCNC: 35 U/L DA — SIGNIFICANT CHANGE UP (ref 10–60)
ANION GAP SERPL CALC-SCNC: 8 MMOL/L — SIGNIFICANT CHANGE UP (ref 5–17)
AST SERPL-CCNC: 26 U/L — SIGNIFICANT CHANGE UP (ref 10–40)
BASOPHILS # BLD AUTO: 0.09 K/UL — SIGNIFICANT CHANGE UP (ref 0–0.2)
BASOPHILS NFR BLD AUTO: 0.6 % — SIGNIFICANT CHANGE UP (ref 0–2)
BILIRUB SERPL-MCNC: 0.4 MG/DL — SIGNIFICANT CHANGE UP (ref 0.2–1.2)
BLD GP AB SCN SERPL QL: SIGNIFICANT CHANGE UP
BUN SERPL-MCNC: 12 MG/DL — SIGNIFICANT CHANGE UP (ref 7–18)
CALCIUM SERPL-MCNC: 10 MG/DL — SIGNIFICANT CHANGE UP (ref 8.4–10.5)
CHLORIDE SERPL-SCNC: 103 MMOL/L — SIGNIFICANT CHANGE UP (ref 96–108)
CO2 SERPL-SCNC: 25 MMOL/L — SIGNIFICANT CHANGE UP (ref 22–31)
CREAT SERPL-MCNC: 0.84 MG/DL — SIGNIFICANT CHANGE UP (ref 0.5–1.3)
EOSINOPHIL # BLD AUTO: 0.34 K/UL — SIGNIFICANT CHANGE UP (ref 0–0.5)
EOSINOPHIL NFR BLD AUTO: 2.4 % — SIGNIFICANT CHANGE UP (ref 0–6)
GLUCOSE SERPL-MCNC: 121 MG/DL — HIGH (ref 70–99)
HCG UR QL: NEGATIVE — SIGNIFICANT CHANGE UP
HCT VFR BLD CALC: 35.5 % — SIGNIFICANT CHANGE UP (ref 34.5–45)
HGB BLD-MCNC: 11.4 G/DL — LOW (ref 11.5–15.5)
IMM GRANULOCYTES NFR BLD AUTO: 0.8 % — SIGNIFICANT CHANGE UP (ref 0–1.5)
LYMPHOCYTES # BLD AUTO: 24 % — SIGNIFICANT CHANGE UP (ref 13–44)
LYMPHOCYTES # BLD AUTO: 3.46 K/UL — HIGH (ref 1–3.3)
MCHC RBC-ENTMCNC: 26 PG — LOW (ref 27–34)
MCHC RBC-ENTMCNC: 32.1 GM/DL — SIGNIFICANT CHANGE UP (ref 32–36)
MCV RBC AUTO: 80.9 FL — SIGNIFICANT CHANGE UP (ref 80–100)
MONOCYTES # BLD AUTO: 0.88 K/UL — SIGNIFICANT CHANGE UP (ref 0–0.9)
MONOCYTES NFR BLD AUTO: 6.1 % — SIGNIFICANT CHANGE UP (ref 2–14)
NEUTROPHILS # BLD AUTO: 9.5 K/UL — HIGH (ref 1.8–7.4)
NEUTROPHILS NFR BLD AUTO: 66.1 % — SIGNIFICANT CHANGE UP (ref 43–77)
NRBC # BLD: 0 /100 WBCS — SIGNIFICANT CHANGE UP (ref 0–0)
PLATELET # BLD AUTO: 429 K/UL — HIGH (ref 150–400)
POTASSIUM SERPL-MCNC: 4.1 MMOL/L — SIGNIFICANT CHANGE UP (ref 3.5–5.3)
POTASSIUM SERPL-SCNC: 4.1 MMOL/L — SIGNIFICANT CHANGE UP (ref 3.5–5.3)
PROT SERPL-MCNC: 8.4 G/DL — HIGH (ref 6–8.3)
RBC # BLD: 4.39 M/UL — SIGNIFICANT CHANGE UP (ref 3.8–5.2)
RBC # FLD: 15.9 % — HIGH (ref 10.3–14.5)
SODIUM SERPL-SCNC: 136 MMOL/L — SIGNIFICANT CHANGE UP (ref 135–145)
WBC # BLD: 14.39 K/UL — HIGH (ref 3.8–10.5)
WBC # FLD AUTO: 14.39 K/UL — HIGH (ref 3.8–10.5)

## 2021-12-21 PROCEDURE — 87186 SC STD MICRODIL/AGAR DIL: CPT

## 2021-12-21 PROCEDURE — 76830 TRANSVAGINAL US NON-OB: CPT

## 2021-12-21 PROCEDURE — 76830 TRANSVAGINAL US NON-OB: CPT | Mod: 26

## 2021-12-21 PROCEDURE — 81025 URINE PREGNANCY TEST: CPT

## 2021-12-21 PROCEDURE — 76856 US EXAM PELVIC COMPLETE: CPT

## 2021-12-21 PROCEDURE — 81001 URINALYSIS AUTO W/SCOPE: CPT

## 2021-12-21 PROCEDURE — 96374 THER/PROPH/DIAG INJ IV PUSH: CPT

## 2021-12-21 PROCEDURE — 86901 BLOOD TYPING SEROLOGIC RH(D): CPT

## 2021-12-21 PROCEDURE — 85025 COMPLETE CBC W/AUTO DIFF WBC: CPT

## 2021-12-21 PROCEDURE — 99285 EMERGENCY DEPT VISIT HI MDM: CPT

## 2021-12-21 PROCEDURE — 99284 EMERGENCY DEPT VISIT MOD MDM: CPT | Mod: 25

## 2021-12-21 PROCEDURE — 87086 URINE CULTURE/COLONY COUNT: CPT

## 2021-12-21 PROCEDURE — 86850 RBC ANTIBODY SCREEN: CPT

## 2021-12-21 PROCEDURE — 36415 COLL VENOUS BLD VENIPUNCTURE: CPT

## 2021-12-21 PROCEDURE — 76856 US EXAM PELVIC COMPLETE: CPT | Mod: 26

## 2021-12-21 PROCEDURE — 86900 BLOOD TYPING SEROLOGIC ABO: CPT

## 2021-12-21 PROCEDURE — 80053 COMPREHEN METABOLIC PANEL: CPT

## 2021-12-21 RX ORDER — ONDANSETRON 8 MG/1
4 TABLET, FILM COATED ORAL ONCE
Refills: 0 | Status: COMPLETED | OUTPATIENT
Start: 2021-12-21 | End: 2021-12-21

## 2021-12-21 RX ORDER — SODIUM CHLORIDE 9 MG/ML
1000 INJECTION INTRAMUSCULAR; INTRAVENOUS; SUBCUTANEOUS ONCE
Refills: 0 | Status: COMPLETED | OUTPATIENT
Start: 2021-12-21 | End: 2021-12-21

## 2021-12-21 RX ORDER — ACETAMINOPHEN 500 MG
650 TABLET ORAL ONCE
Refills: 0 | Status: COMPLETED | OUTPATIENT
Start: 2021-12-21 | End: 2021-12-21

## 2021-12-21 RX ADMIN — ONDANSETRON 4 MILLIGRAM(S): 8 TABLET, FILM COATED ORAL at 14:27

## 2021-12-21 RX ADMIN — SODIUM CHLORIDE 1000 MILLILITER(S): 9 INJECTION INTRAMUSCULAR; INTRAVENOUS; SUBCUTANEOUS at 14:27

## 2021-12-21 RX ADMIN — Medication 650 MILLIGRAM(S): at 14:27

## 2021-12-21 NOTE — ED PROVIDER NOTE - PROGRESS NOTE DETAILS
US showed a small uterine fibroid. Pelvic exam performed. Small amount of blood in vaginal vault. Patient has not changed her pad since prior to arriving to the ED 5 hours ago. Will have patient follow up outpatient with GYN.

## 2021-12-21 NOTE — ED PROVIDER NOTE - NSFOLLOWUPINSTRUCTIONS_ED_ALL_ED_FT
Follow up with the OBGYN within 2 days.     If you are changing a heavy pad more than once every 2 hours return to the ED for evaluation    If you experience any new or worsening symptoms or if you are concerned you can always come back to the emergency for a re-evaluation.

## 2021-12-21 NOTE — ED PROVIDER NOTE - OBJECTIVE STATEMENT
46 y.o female with a PMHx of hypertension, hyperlipidemia, thyroidectomy, and 4 hernia repairs is presenting with reports of heavy period for 11 days and then no period for 6 months. Patient now states she has gotten a heavy period again for x6 days with large clots. Patient reports changing her pad every 2 hours and reports generalized weakness, headache, and nausea. Patient denies chest pain, shortness of breath, pain with urination, and possible pregnancy.

## 2021-12-21 NOTE — ED PROVIDER NOTE - NS ED ROS FT
(+) heavy periods  (+) nausea  (+) weakness  (+) headache  (-) chest pain  (-) shortness of breath  (-) dysuria  (-) possible pregnancy

## 2021-12-21 NOTE — ED PROVIDER NOTE - ATTENDING CONTRIBUTION TO CARE
here for vaginal bleeding heavy in the last 6 days  also having bleeding  vaginal exam shows small amount of blood in the vaginal vault  cervix closed  patient is not pregnant  sono shows fibroids  agree with acps assessment hx and physical and disposition  blood count ok

## 2021-12-21 NOTE — ED PROVIDER NOTE - PATIENT PORTAL LINK FT
You can access the FollowMyHealth Patient Portal offered by [facility] by registering at the following website: [email or web address]. By joining [facility]’s FollowMyHealth portal, you will also be able to view your health information using other applications (apps) compatible with our system.

## 2021-12-22 NOTE — PATIENT PROFILE ADULT. - NSTOBACCONEVERSMOKERY/N_GEN_A
Pt called regarding the results from today's labs.     Writer provided an estimated turn around time and that the call will be noted and returned accordingly.     Please advise    No

## 2021-12-24 LAB
-  AMPICILLIN/SULBACTAM: SIGNIFICANT CHANGE UP
-  CEFAZOLIN: SIGNIFICANT CHANGE UP
-  GENTAMICIN: SIGNIFICANT CHANGE UP
-  OXACILLIN: SIGNIFICANT CHANGE UP
-  PENICILLIN: SIGNIFICANT CHANGE UP
-  RIFAMPIN: SIGNIFICANT CHANGE UP
-  TETRACYCLINE: SIGNIFICANT CHANGE UP
-  TRIMETHOPRIM/SULFAMETHOXAZOLE: SIGNIFICANT CHANGE UP
-  VANCOMYCIN: SIGNIFICANT CHANGE UP
CULTURE RESULTS: SIGNIFICANT CHANGE UP
METHOD TYPE: SIGNIFICANT CHANGE UP
ORGANISM # SPEC MICROSCOPIC CNT: SIGNIFICANT CHANGE UP
ORGANISM # SPEC MICROSCOPIC CNT: SIGNIFICANT CHANGE UP
SPECIMEN SOURCE: SIGNIFICANT CHANGE UP

## 2021-12-26 NOTE — ED POST DISCHARGE NOTE - DETAILS
voicemail Denies dysuria, urgency, frequency, hematuria, fever, back pain, abd pain. Has appt with gyn tomorrow. Asked for copy of results to be faxed to office.

## 2022-01-05 ENCOUNTER — NON-APPOINTMENT (OUTPATIENT)
Age: 47
End: 2022-01-05

## 2022-02-16 ENCOUNTER — APPOINTMENT (OUTPATIENT)
Dept: UROLOGY | Facility: CLINIC | Age: 47
End: 2022-02-16
Payer: COMMERCIAL

## 2022-02-16 PROCEDURE — 76775 US EXAM ABDO BACK WALL LIM: CPT

## 2022-02-16 PROCEDURE — 99214 OFFICE O/P EST MOD 30 MIN: CPT | Mod: 25

## 2022-02-16 NOTE — HISTORY OF PRESENT ILLNESS
[FreeTextEntry1] : Very pleasant 46-year-old woman presents for follow-up of kidney stones, urinary urgency, new complaint of dysuria.  She reports that she was evaluated in the emergency department in December and was given antibiotics for urinary tract infection.  She reports that over the last month she has had increased urinary urgency.  She underwent a renal ultrasound today which demonstrates an approximately 5 mm nonobstructing right renal stone and bilateral renal cysts.  She denies flank pain.  No nausea or vomiting.  No other complaints.

## 2022-02-16 NOTE — ASSESSMENT
[FreeTextEntry1] : Very pleasant 46-year-old woman who presents for follow-up of kidney stones, urinary urgency, new complaint of dysuria\par -Ultrasound images reviewed with the patient demonstrating a 4.9 mm echogenic focus in the right kidney without evidence of obstruction.  This also demonstrates benign-appearing renal cysts\par -We discussed general stone prevention strategies again\par -Given urinary urgency and new complaint of dysuria we will do a urinalysis and urine culture\par -We discussed options for management of urinary urgency.  Patient was previously prescribed solifenacin but is no longer taking the medication.  After results of urine culture we will discuss options for management of urinary urgency again if urine culture is negative

## 2022-02-18 LAB
APPEARANCE: ABNORMAL
BACTERIA UR CULT: NORMAL
BACTERIA: ABNORMAL
BILIRUBIN URINE: NEGATIVE
BLOOD URINE: NEGATIVE
CALCIUM OXALATE CRYSTALS: ABNORMAL
COLOR: YELLOW
GLUCOSE QUALITATIVE U: ABNORMAL
HYALINE CASTS: 0 /LPF
KETONES URINE: NEGATIVE
LEUKOCYTE ESTERASE URINE: NEGATIVE
MICROSCOPIC-UA: NORMAL
NITRITE URINE: NEGATIVE
PH URINE: 6.5
PROTEIN URINE: NORMAL
RED BLOOD CELLS URINE: 3 /HPF
SPECIFIC GRAVITY URINE: 1.02
SQUAMOUS EPITHELIAL CELLS: 4 /HPF
UROBILINOGEN URINE: NORMAL
WHITE BLOOD CELLS URINE: 2 /HPF

## 2022-05-13 NOTE — DISCUSSION/SUMMARY
[FreeTextEntry1] : History of thyroid goiter with resection\par \par reports snoring and daytime somnolence.\par \par PLAN\par \par home sleep study, per patient's request\par \par [facility] 
No

## 2022-05-17 ENCOUNTER — APPOINTMENT (OUTPATIENT)
Dept: UROLOGY | Facility: CLINIC | Age: 47
End: 2022-05-17
Payer: COMMERCIAL

## 2022-05-17 PROCEDURE — 99214 OFFICE O/P EST MOD 30 MIN: CPT | Mod: 25

## 2022-05-17 PROCEDURE — 76775 US EXAM ABDO BACK WALL LIM: CPT

## 2022-05-17 RX ORDER — SOLIFENACIN SUCCINATE 5 MG/1
5 TABLET ORAL
Qty: 90 | Refills: 1 | Status: ACTIVE | COMMUNITY
Start: 2020-01-22 | End: 1900-01-01

## 2022-05-17 NOTE — ASSESSMENT
[FreeTextEntry1] : Very pleasant 46-year-old woman who presents for follow-up of kidney stones, renal cysts, urinary urgency\par -Ultrasound images reviewed today demonstrating 4.7 mm stable right upper pole stone as well as bilateral benign-appearing renal cysts\par -Restart Vesicare given improvement in her urinary symptoms in the past\par -I discussed the risks, benefits, alternatives, and possible side effects of Vesicare therapy with the patient, including but not limited to urinary retention, dry mouth, dry eyes, constipation, and confusion.  Patient understands that he must call immediately should any of these symptoms occur

## 2022-05-17 NOTE — HISTORY OF PRESENT ILLNESS
[FreeTextEntry1] : Very pleasant 46-year-old woman presents for follow-up of kidney stones, urinary urgency, she currently feels well.  She denies dysuria.  No hematuria.  She underwent a renal ultrasound today which demonstrates a stable 4.7 mm intrarenal stone without hydronephrosis.  He also demonstrates benign-appearing renal cysts.  No other complaints.

## 2022-06-24 NOTE — ED ADULT NURSE NOTE - CAS DISCH TRANSFER METHOD
Private car Tissue Cultured Epidermal Autograft Text: The defect edges were debeveled with a #15 scalpel blade.  Given the location of the defect, shape of the defect and the proximity to free margins a tissue cultured epidermal autograft was deemed most appropriate.  The graft was then trimmed to fit the size of the defect.  The graft was then placed in the primary defect and oriented appropriately.

## 2022-07-26 NOTE — H&P PST ADULT - PSH
Elevate legs to avoid pressure to wounds and edema control.  Avoid additional trauma  Left leg is healed.  Moisturize daily and continue the foam with bandaide for the next week then may leave open to air and moisturize daily. Wear your tubi  to both lower legs every day.    Right leg: alginate, gauze pad, rolled gauze and paper tape with your tubi .  May  change every other day depending on the amount of drainage.    You will follow up with Dr. Orona after surgery.  If surgery is cancelled you can call and schedule an appointment with Dr. Cabral for next week   H/O:   x3  History of hernia repair  x 3

## 2022-11-22 ENCOUNTER — APPOINTMENT (OUTPATIENT)
Dept: UROLOGY | Facility: CLINIC | Age: 47
End: 2022-11-22

## 2022-11-22 DIAGNOSIS — N20.0 CALCULUS OF KIDNEY: ICD-10-CM

## 2022-11-22 PROCEDURE — 76775 US EXAM ABDO BACK WALL LIM: CPT

## 2022-11-22 PROCEDURE — 99213 OFFICE O/P EST LOW 20 MIN: CPT

## 2022-11-22 NOTE — HISTORY OF PRESENT ILLNESS
[FreeTextEntry1] : Very pleasant 47-year-old woman presents for follow-up of kidney stones, urinary urgency.  She currently feels well.  She denies dysuria.  No hematuria.  She underwent a renal ultrasound today which demonstrates a stable 4.9 mm i right ntrarenal stone without hydronephrosis and benign-appearing renal cysts.  No other complaints.

## 2022-11-22 NOTE — ASSESSMENT
[FreeTextEntry1] : Very pleasant 47-year-old woman who presents for follow-up of renal cysts, kidney stones\par -Renal ultrasound images reviewed with the patient today demonstrating a 4.9 mm stable right intrarenal stone, as well as benign-appearing renal cysts\par -We again discussed options for management of a 4.9 mm renal stone at this time she wishes to observe\par -Repeat renal ultrasound in 6 months

## 2022-12-20 ENCOUNTER — OUTPATIENT (OUTPATIENT)
Dept: OUTPATIENT SERVICES | Facility: HOSPITAL | Age: 47
LOS: 1 days | End: 2022-12-20

## 2022-12-20 VITALS
DIASTOLIC BLOOD PRESSURE: 80 MMHG | RESPIRATION RATE: 16 BRPM | OXYGEN SATURATION: 97 % | TEMPERATURE: 97 F | HEART RATE: 82 BPM | HEIGHT: 62 IN | WEIGHT: 222.01 LBS | SYSTOLIC BLOOD PRESSURE: 146 MMHG

## 2022-12-20 DIAGNOSIS — R93.89 ABNORMAL FINDINGS ON DIAGNOSTIC IMAGING OF OTHER SPECIFIED BODY STRUCTURES: ICD-10-CM

## 2022-12-20 DIAGNOSIS — E11.9 TYPE 2 DIABETES MELLITUS WITHOUT COMPLICATIONS: ICD-10-CM

## 2022-12-20 DIAGNOSIS — Z01.812 ENCOUNTER FOR PREPROCEDURAL LABORATORY EXAMINATION: ICD-10-CM

## 2022-12-20 DIAGNOSIS — I10 ESSENTIAL (PRIMARY) HYPERTENSION: ICD-10-CM

## 2022-12-20 DIAGNOSIS — Z98.51 TUBAL LIGATION STATUS: Chronic | ICD-10-CM

## 2022-12-20 DIAGNOSIS — E89.0 POSTPROCEDURAL HYPOTHYROIDISM: Chronic | ICD-10-CM

## 2022-12-20 DIAGNOSIS — Z98.890 OTHER SPECIFIED POSTPROCEDURAL STATES: Chronic | ICD-10-CM

## 2022-12-20 DIAGNOSIS — Z98.89 OTHER SPECIFIED POSTPROCEDURAL STATES: Chronic | ICD-10-CM

## 2022-12-20 DIAGNOSIS — Z98.891 HISTORY OF UTERINE SCAR FROM PREVIOUS SURGERY: Chronic | ICD-10-CM

## 2022-12-20 LAB
A1C WITH ESTIMATED AVERAGE GLUCOSE RESULT: 7 % — HIGH (ref 4–5.6)
ANION GAP SERPL CALC-SCNC: 15 MMOL/L — HIGH (ref 7–14)
BUN SERPL-MCNC: 12 MG/DL — SIGNIFICANT CHANGE UP (ref 7–23)
CALCIUM SERPL-MCNC: 10.1 MG/DL — SIGNIFICANT CHANGE UP (ref 8.4–10.5)
CHLORIDE SERPL-SCNC: 100 MMOL/L — SIGNIFICANT CHANGE UP (ref 98–107)
CO2 SERPL-SCNC: 23 MMOL/L — SIGNIFICANT CHANGE UP (ref 22–31)
CREAT SERPL-MCNC: 0.67 MG/DL — SIGNIFICANT CHANGE UP (ref 0.5–1.3)
EGFR: 107 ML/MIN/1.73M2 — SIGNIFICANT CHANGE UP
ESTIMATED AVERAGE GLUCOSE: 154 — SIGNIFICANT CHANGE UP
GLUCOSE SERPL-MCNC: 186 MG/DL — HIGH (ref 70–99)
HCT VFR BLD CALC: 38.6 % — SIGNIFICANT CHANGE UP (ref 34.5–45)
HGB BLD-MCNC: 12 G/DL — SIGNIFICANT CHANGE UP (ref 11.5–15.5)
MCHC RBC-ENTMCNC: 26.5 PG — LOW (ref 27–34)
MCHC RBC-ENTMCNC: 31.1 GM/DL — LOW (ref 32–36)
MCV RBC AUTO: 85.2 FL — SIGNIFICANT CHANGE UP (ref 80–100)
NRBC # BLD: 0 /100 WBCS — SIGNIFICANT CHANGE UP (ref 0–0)
NRBC # FLD: 0 K/UL — SIGNIFICANT CHANGE UP (ref 0–0)
PLATELET # BLD AUTO: 347 K/UL — SIGNIFICANT CHANGE UP (ref 150–400)
POTASSIUM SERPL-MCNC: 4 MMOL/L — SIGNIFICANT CHANGE UP (ref 3.5–5.3)
POTASSIUM SERPL-SCNC: 4 MMOL/L — SIGNIFICANT CHANGE UP (ref 3.5–5.3)
RBC # BLD: 4.53 M/UL — SIGNIFICANT CHANGE UP (ref 3.8–5.2)
RBC # FLD: 14.3 % — SIGNIFICANT CHANGE UP (ref 10.3–14.5)
SODIUM SERPL-SCNC: 138 MMOL/L — SIGNIFICANT CHANGE UP (ref 135–145)
WBC # BLD: 10.4 K/UL — SIGNIFICANT CHANGE UP (ref 3.8–10.5)
WBC # FLD AUTO: 10.4 K/UL — SIGNIFICANT CHANGE UP (ref 3.8–10.5)

## 2022-12-20 PROCEDURE — 93010 ELECTROCARDIOGRAM REPORT: CPT

## 2022-12-20 RX ORDER — METFORMIN HYDROCHLORIDE 850 MG/1
1 TABLET ORAL
Qty: 0 | Refills: 0 | DISCHARGE

## 2022-12-20 RX ORDER — PREGABALIN 225 MG/1
1 CAPSULE ORAL
Qty: 0 | Refills: 0 | DISCHARGE

## 2022-12-20 RX ORDER — ATORVASTATIN CALCIUM 80 MG/1
1 TABLET, FILM COATED ORAL
Qty: 0 | Refills: 0 | DISCHARGE

## 2022-12-20 RX ORDER — SEMAGLUTIDE 0.68 MG/ML
0.25 INJECTION, SOLUTION SUBCUTANEOUS
Qty: 0 | Refills: 0 | DISCHARGE

## 2022-12-20 RX ORDER — CHOLECALCIFEROL (VITAMIN D3) 125 MCG
1 CAPSULE ORAL
Qty: 0 | Refills: 0 | DISCHARGE

## 2022-12-20 RX ORDER — VALACYCLOVIR 500 MG/1
1000 TABLET, FILM COATED ORAL
Qty: 0 | Refills: 0 | DISCHARGE

## 2022-12-20 RX ORDER — LOSARTAN POTASSIUM 100 MG/1
1 TABLET, FILM COATED ORAL
Qty: 0 | Refills: 0 | DISCHARGE

## 2022-12-20 RX ORDER — BENZOYL PEROXIDE MICRONIZED 5.8 %
1 TOWELETTE (EA) TOPICAL
Qty: 0 | Refills: 0 | DISCHARGE

## 2022-12-20 RX ORDER — FERROUS SULFATE 325(65) MG
65 TABLET ORAL
Qty: 0 | Refills: 0 | DISCHARGE

## 2022-12-20 NOTE — H&P PST ADULT - PROBLEM SELECTOR PLAN 1
Scheduled dilation curettage hysteroscopy 1/10/2023  Written & verbal preop instructions, gi prophylaxis & surgical soap given  Pt verbalized good understanding.  Teach back done on surgical soap instructions.  KEENA precautions recommended.

## 2022-12-20 NOTE — H&P PST ADULT - NSICDXPASTSURGICALHX_GEN_ALL_CORE_FT
PAST SURGICAL HISTORY:  H/O abdominoplasty 2015    H/O partial thyroidectomy     H/O:  x3    History of  x 3 (, , )    History of hernia repair x 3    History of incisional hernia repair x 3     PAST SURGICAL HISTORY:  H/O abdominoplasty 2015    H/O partial thyroidectomy     H/O tubal ligation     H/O:  x3    History of  x 3 (, , )    History of hernia repair x 3    History of incisional hernia repair x 3

## 2022-12-20 NOTE — H&P PST ADULT - ATTENDING COMMENTS
48 yo P3 with history of abnormal uterine bleeding > 1yr ago presents to office for d&c hysteroscopy. Last menstrual cycle was 2/2022.  Patient presented for f/u visit a few months ago and TVs reported thickened endometrial echo. Patient was advised if no menses than further testing would be warranted for tissue diagnosis. Office EMB was performed and was insufficient for diagnosis.  The decision was made to proceed with Dilation, curettage, hysteroscopy as it is diagnostic and therapeutic.  The patient verbalized understanding. Consent signed and witnessed. Risks and benefits of procedure explained. Risks include but not limited to anesthesia risks, scar tissue formation, injury to adjacent organs. Anticipate uncomplicated intraop and postop course. MBeauvil

## 2022-12-20 NOTE — H&P PST ADULT - PROBLEM SELECTOR PLAN 3
Fs on admit  Pt  instructed last dose metformin on 1/9/22 & continue ozempic per routine schedule.  Pt verbalized understanding

## 2022-12-20 NOTE — H&P PST ADULT - NSICDXPASTMEDICALHX_GEN_ALL_CORE_FT
PAST MEDICAL HISTORY:  Chronic anemia     DM (diabetes mellitus)     Genital sore recurrent w/Menstrual period, uses PRN Valcyclovir.    Hematuria     Hernia umbilical    Incisional hernia     Kidney stones     Morbid obesity due to excess calories     Thyroid nodule s/p biopsy 2015    Type 2 diabetes mellitus without complication, unspecified long term insulin use status     Vitamin B12 deficiency

## 2023-01-04 NOTE — PROGRESS NOTE ADULT - SUBJECTIVE AND OBJECTIVE BOX
Your coumadin (INR) level is too high.  Do not take your coumadin for now, until your cardiologist instructs you further.  You received Vitamin K here today.     Coumadin clinic number to call is [phone] to schedule appointment tomorrow and Friday this week.  [address] [facility] [address] and [address] [place], [place] [address] are the closest locations.    Follow up with [facility].  The nasal packing needs to be removed in 3-5 days. Call them tomorrow morning to see when they want you to follow-up for removal.    Do not blow your nose, avoid sneezing (if you must, pinch nose and sneeze with mouth open). Avoid heavy lifting, bending forward, or hot showers.     A small amount of oozing blood is okay, but you need to return to the emergency department if there is significant bleeding from the nose or down the back of your throat, or any other new or concerning symptoms.   Subjective    Patient seen and examined. Persistent fevers. Notes subjective fevers/chills. Desaturation overnight to 80s. CTPA performed. Negative for PE but with ground glass opacities concerning for PNA.    Objective    Vital signs  T(F): , Max: 102.9 (10-30-17 @ 22:01)  HR: 99 (10-31-17 @ 04:32)  BP: 148/83 (10-31-17 @ 04:32)  SpO2: 93% (10-31-17 @ 04:32)  Wt(kg): --    Output     10-29 @ 07:01  -  10-30 @ 07:00  --------------------------------------------------------  IN: 4560 mL / OUT: 3850 mL / NET: 710 mL    10-30 @ 07:01  -  10-31 @ 06:00  --------------------------------------------------------  IN: 4205 mL / OUT: 3725 mL / NET: 480 mL        General: NAD  Resp: non labored, on nasal cannula  Abdomen: soft/non-tender/non-distended  : rodriguez clear     Labs      10-30 @ 22:20    WBC 12.6  / Hct 29.5  / SCr 0.66     10-30 @ 09:27    WBC 13.1  / Hct 30.8  / SCr --

## 2023-01-11 NOTE — H&P PST ADULT - PSH
What Is The Reason For Today's Visit?: History of Melanoma Year Excised?: 2015 [place] Depth?: 0.2 H/O:   x3  History of hernia repair  x 3

## 2023-01-30 ENCOUNTER — TRANSCRIPTION ENCOUNTER (OUTPATIENT)
Age: 48
End: 2023-01-30

## 2023-01-30 NOTE — ASU PATIENT PROFILE, ADULT - NSICDXPASTSURGICALHX_GEN_ALL_CORE_FT
PAST SURGICAL HISTORY:  H/O abdominoplasty 2015    H/O partial thyroidectomy     H/O tubal ligation     H/O:  x3    History of  x 3 (, , )    History of hernia repair x 3    History of incisional hernia repair x 3

## 2023-01-30 NOTE — ASU PATIENT PROFILE, ADULT - FALL HARM RISK - UNIVERSAL INTERVENTIONS
Bed in lowest position, wheels locked, appropriate side rails in place/Call bell, personal items and telephone in reach/Instruct patient to call for assistance before getting out of bed or chair/Non-slip footwear when patient is out of bed/[place] to call system/Physically safe environment - no spills, clutter or unnecessary equipment/Purposeful Proactive Rounding/Room/bathroom lighting operational, light cord in reach

## 2023-01-31 ENCOUNTER — OUTPATIENT (OUTPATIENT)
Dept: OUTPATIENT SERVICES | Facility: HOSPITAL | Age: 48
LOS: 1 days | Discharge: HOME | End: 2023-01-31
Payer: COMMERCIAL

## 2023-01-31 ENCOUNTER — TRANSCRIPTION ENCOUNTER (OUTPATIENT)
Age: 48
End: 2023-01-31

## 2023-01-31 VITALS
TEMPERATURE: 98 F | OXYGEN SATURATION: 98 % | RESPIRATION RATE: 19 BRPM | DIASTOLIC BLOOD PRESSURE: 65 MMHG | SYSTOLIC BLOOD PRESSURE: 132 MMHG | HEART RATE: 78 BPM

## 2023-01-31 VITALS
HEIGHT: 62 IN | WEIGHT: 218.04 LBS | DIASTOLIC BLOOD PRESSURE: 79 MMHG | HEART RATE: 80 BPM | TEMPERATURE: 98 F | SYSTOLIC BLOOD PRESSURE: 156 MMHG | RESPIRATION RATE: 16 BRPM | OXYGEN SATURATION: 95 %

## 2023-01-31 DIAGNOSIS — Z98.890 OTHER SPECIFIED POSTPROCEDURAL STATES: Chronic | ICD-10-CM

## 2023-01-31 DIAGNOSIS — Z98.89 OTHER SPECIFIED POSTPROCEDURAL STATES: Chronic | ICD-10-CM

## 2023-01-31 DIAGNOSIS — E89.0 POSTPROCEDURAL HYPOTHYROIDISM: Chronic | ICD-10-CM

## 2023-01-31 DIAGNOSIS — R93.89 ABNORMAL FINDINGS ON DIAGNOSTIC IMAGING OF OTHER SPECIFIED BODY STRUCTURES: ICD-10-CM

## 2023-01-31 DIAGNOSIS — Z98.891 HISTORY OF UTERINE SCAR FROM PREVIOUS SURGERY: Chronic | ICD-10-CM

## 2023-01-31 DIAGNOSIS — Z98.51 TUBAL LIGATION STATUS: Chronic | ICD-10-CM

## 2023-01-31 LAB — GLUCOSE BLDC GLUCOMTR-MCNC: 117 MG/DL — HIGH (ref 70–99)

## 2023-01-31 PROCEDURE — 88305 TISSUE EXAM BY PATHOLOGIST: CPT | Mod: 26

## 2023-01-31 RX ORDER — SODIUM CHLORIDE 9 MG/ML
1000 INJECTION, SOLUTION INTRAVENOUS
Refills: 0 | Status: DISCONTINUED | OUTPATIENT
Start: 2023-01-31 | End: 2023-02-14

## 2023-01-31 RX ORDER — IBUPROFEN 200 MG
1 TABLET ORAL
Qty: 28 | Refills: 0
Start: 2023-01-31 | End: 2023-02-06

## 2023-01-31 NOTE — ASU DISCHARGE PLAN (ADULT/PEDIATRIC) - NURSING INSTRUCTIONS
You received IV Tylenol for pain management at 12:45 PM. Please DO NOT take any Tylenol (Acetaminophen) containing products, such as Vicodin, Percocet, Excedrin, and cold medications for the next 6 hours (until 6:45 PM). DO NOT TAKE MORE THAN 3000 MG OF TYLENOL in a 24 hour period.    You received IV Toradol for pain management at 12:45. Please DO NOT take Motrin/Ibuprofen/Advil/Aleve/NSAIDs (Non-Steroidal Anti-Inflammatory Drugs) for the next 6 hours (until 6:45 PM).

## 2023-01-31 NOTE — ASU DISCHARGE PLAN (ADULT/PEDIATRIC) - CARE PROVIDER_API CALL
Barbara Davis  OBSTETRICS AND GYNECOLOGY  [address] [place] [address]  Phone: [phone]  Fax: [phone]  Follow Up Time:

## 2023-02-03 LAB — SURGICAL PATHOLOGY STUDY: SIGNIFICANT CHANGE UP

## 2023-05-31 ENCOUNTER — APPOINTMENT (OUTPATIENT)
Dept: UROLOGY | Facility: CLINIC | Age: 48
End: 2023-05-31
Payer: COMMERCIAL

## 2023-05-31 DIAGNOSIS — R39.15 URGENCY OF URINATION: ICD-10-CM

## 2023-05-31 PROCEDURE — 99213 OFFICE O/P EST LOW 20 MIN: CPT

## 2023-05-31 PROCEDURE — 76775 US EXAM ABDO BACK WALL LIM: CPT

## 2023-05-31 NOTE — ASSESSMENT
[FreeTextEntry1] : Very pleasant 47-year-old woman who presents for follow-up of kidney stones, renal cysts\par -Ultrasound images reviewed with the patient demonstrating a 5 mm stable right upper pole renal calcification as well as a number of benign-appearing renal cysts\par -Repeat renal ultrasound in 6 months and follow-up at that time

## 2023-05-31 NOTE — HISTORY OF PRESENT ILLNESS
[FreeTextEntry1] : Very pleasant 47-year-old woman presents for follow-up of kidney stones, urinary urgency.  She continues to feel well.  She denies dysuria.  No hematuria.  She underwent a renal ultrasound today which demonstrates a stable 5 mm right upper pole renal stone without hydronephrosis and benign-appearing renal cysts.  No other complaints.

## 2023-12-01 ENCOUNTER — APPOINTMENT (OUTPATIENT)
Dept: UROLOGY | Facility: CLINIC | Age: 48
End: 2023-12-01

## 2024-01-02 ENCOUNTER — APPOINTMENT (OUTPATIENT)
Dept: UROLOGY | Facility: CLINIC | Age: 49
End: 2024-01-02
Payer: COMMERCIAL

## 2024-01-02 VITALS
TEMPERATURE: 98.1 F | HEART RATE: 89 BPM | BODY MASS INDEX: 41.59 KG/M2 | DIASTOLIC BLOOD PRESSURE: 77 MMHG | HEIGHT: 62 IN | WEIGHT: 226 LBS | OXYGEN SATURATION: 97 % | SYSTOLIC BLOOD PRESSURE: 126 MMHG

## 2024-01-02 DIAGNOSIS — E11.9 TYPE 2 DIABETES MELLITUS W/OUT COMPLICATIONS: ICD-10-CM

## 2024-01-02 PROCEDURE — 99214 OFFICE O/P EST MOD 30 MIN: CPT

## 2024-01-02 PROCEDURE — 76775 US EXAM ABDO BACK WALL LIM: CPT

## 2024-01-02 NOTE — ASSESSMENT
[FreeTextEntry1] : Very pleasant 48-year-old woman who presents for follow-up of kidney stones -Ultrasound images reviewed with the patient today demonstrating a 5.7 mm calcification in the left kidney with distal shadowing, concerning for a new kidney stone, as well as a stable 5 mm calcification in the right kidney -CT scan for definitive characterization of kidney stones -We briefly discussed surgical options and additional management options for nonobstructing kidney stones -Follow-up after CT scan

## 2024-01-02 NOTE — HISTORY OF PRESENT ILLNESS
[FreeTextEntry1] : Very pleasant 48-year-old woman who presents for follow-up of kidney stones.  She underwent a renal ultrasound today which demonstrates stable appearance of a 5 mm calcification in the right kidney, however new appearance of a 5.7 mm calcification concerning for a stone in the left kidney.  She denies dysuria.  No hematuria.  No flank pain or suprapubic pain.  No other complaints.

## 2024-02-06 ENCOUNTER — APPOINTMENT (OUTPATIENT)
Dept: UROLOGY | Facility: CLINIC | Age: 49
End: 2024-02-06
Payer: COMMERCIAL

## 2024-02-06 DIAGNOSIS — Q61.02 CONGENITAL MULTIPLE RENAL CYSTS: ICD-10-CM

## 2024-02-06 DIAGNOSIS — N20.0 CALCULUS OF KIDNEY: ICD-10-CM

## 2024-02-06 PROCEDURE — 99443: CPT

## 2024-02-06 NOTE — HISTORY OF PRESENT ILLNESS
[FreeTextEntry1] : The patient-doctor relationship has been established via real time audio communication using telemedicine software.  She has requested care to be assessed and treated through telemedicine. She understands that there may be limitations in this process and that she may need further follow up care in the office and/or hospital setting.   Very pleasant 48-year-old woman who presents for follow-up of kidney stones.  She underwent a renal ultrasound at last visit which demonstrated stable appearance of a 5 mm calcification in the right kidney, however new appearance of a 5.7 mm calcification concerning for a stone in the left kidney.   because of this she underwent a CT scan which demonstrated a 7 mm x 5 mm right kidney stone in addition to another 1 mm right kidney stone but no left kidney stones.  She presents today to discuss options for management of a 7 mm nonobstructing right kidney stone.

## 2024-02-06 NOTE — ASSESSMENT
[FreeTextEntry1] : Very pleasant 48-year-old woman who presents for follow-up of kidney stones -CT images from [facility] radiology reviewed demonstrating a 7 mm nonobstructing right kidney stone in addition to a 1 mm right kidney stone but no left kidney stones -We had an extensive discussion regarding different treatment options for a 7 mm nonobstructing right kidney stone, including observation, ESWL, PCNL, ureteroscopy with laser lithotripsy and at this time she wishes to continue to observe -Repeat renal ultrasound in 6 months and follow-up at that time

## 2024-05-31 NOTE — ED PROVIDER NOTE - ABDOMINAL EXAM
Pt given extra pillows under her right arm and both feet; end of bed foot board removed for comfort not able to extend the foot of the bed electronically, pt much more comfortable now  Pt has all of her personal items at reach, call bell at bedside  Dialysis nurse setting up machine etc   nontender.../soft

## 2024-07-09 ENCOUNTER — APPOINTMENT (OUTPATIENT)
Dept: UROLOGY | Facility: CLINIC | Age: 49
End: 2024-07-09
Payer: COMMERCIAL

## 2024-07-09 VITALS
HEART RATE: 78 BPM | TEMPERATURE: 97.3 F | OXYGEN SATURATION: 98 % | SYSTOLIC BLOOD PRESSURE: 141 MMHG | DIASTOLIC BLOOD PRESSURE: 87 MMHG

## 2024-07-09 DIAGNOSIS — N20.0 CALCULUS OF KIDNEY: ICD-10-CM

## 2024-07-09 PROCEDURE — 99213 OFFICE O/P EST LOW 20 MIN: CPT

## 2024-07-30 ENCOUNTER — APPOINTMENT (OUTPATIENT)
Dept: UROLOGY | Facility: CLINIC | Age: 49
End: 2024-07-30
Payer: COMMERCIAL

## 2024-07-30 VITALS
OXYGEN SATURATION: 98 % | HEIGHT: 62 IN | TEMPERATURE: 97.3 F | DIASTOLIC BLOOD PRESSURE: 78 MMHG | BODY MASS INDEX: 41.59 KG/M2 | WEIGHT: 226 LBS | SYSTOLIC BLOOD PRESSURE: 126 MMHG | HEART RATE: 107 BPM

## 2024-07-30 DIAGNOSIS — N20.0 CALCULUS OF KIDNEY: ICD-10-CM

## 2024-07-30 PROCEDURE — 76775 US EXAM ABDO BACK WALL LIM: CPT

## 2024-07-30 PROCEDURE — G2211 COMPLEX E/M VISIT ADD ON: CPT | Mod: NC

## 2024-07-30 PROCEDURE — 99213 OFFICE O/P EST LOW 20 MIN: CPT

## 2024-07-30 NOTE — HISTORY OF PRESENT ILLNESS
[FreeTextEntry1] : Very pleasant 49-year-old woman who presents for follow-up of kidney stones.  She underwent a repeat renal ultrasound today which demonstrated a 5.8 mm right upper pole kidney stone but no left kidney stones.  She denies dysuria.  No hematuria.  She reports that she wishes to avoid anesthesia at this time as she has had multiple surgeries and anesthesia in the past.

## 2024-07-30 NOTE — PHYSICAL EXAM
[Normal Appearance] : normal appearance [Well Groomed] : well groomed [General Appearance - In No Acute Distress] : no acute distress [] : no respiratory distress [Respiration, Rhythm And Depth] : normal respiratory rhythm and effort [Exaggerated Use Of Accessory Muscles For Inspiration] : no accessory muscle use [Normal Station and Gait] : the gait and station were normal for the patient's age [Oriented To Time, Place, And Person] : oriented to person, place, and time [Affect] : the affect was normal [Mood] : the mood was normal

## 2024-07-30 NOTE — ASSESSMENT
[FreeTextEntry1] : Very pleasant 49-year-old woman who presents for follow-up of kidney stones -Ultrasound images reviewed with the patient demonstrating a 5.8 mm stable right upper pole kidney stone without hydronephrosis, renal masses, nor left kidney stones -We discussed options for management of a 5.8 mm nonobstructing right intrarenal stone, including ESWL, PCNL, ureteroscopy with laser lithotripsy, and observation at this time she wishes to continue to observe -Repeat renal ultrasound in 6 months and follow-up at that time -We discussed general stone prevention strategies   I have spent 21 minutes on this encounter, exclusive of separately billed services  Patient is being seen today for evaluation and management of a chronic and longitudinal ongoing condition and I am of the primary treating physician

## 2024-08-30 NOTE — ED PROVIDER NOTE - CPE EDP GASTRO NORM
Quality 226: Preventive Care And Screening: Tobacco Use: Screening And Cessation Intervention: Patient screened for tobacco use and is an ex/non-smoker Detail Level: Simple - - -

## 2024-10-16 ENCOUNTER — NON-APPOINTMENT (OUTPATIENT)
Age: 49
End: 2024-10-16

## 2025-01-31 ENCOUNTER — APPOINTMENT (OUTPATIENT)
Dept: UROLOGY | Facility: CLINIC | Age: 50
End: 2025-01-31

## 2025-02-04 ENCOUNTER — APPOINTMENT (OUTPATIENT)
Dept: UROLOGY | Facility: CLINIC | Age: 50
End: 2025-02-04

## 2025-02-25 NOTE — H&P PST ADULT - TEMPERATURE IN CELSIUS (DEGREES C)
PROCEDURES:  Septoplasty, nasal, with radiofrequency ablation of turbinates 25-Feb-2025 11:56:49  Michael Garcia  Repair of nasal valve 25-Feb-2025 11:56:55  Michael Garcia  
36

## 2025-04-08 ENCOUNTER — APPOINTMENT (OUTPATIENT)
Dept: UROLOGY | Facility: CLINIC | Age: 50
End: 2025-04-08
Payer: COMMERCIAL

## 2025-04-08 VITALS
SYSTOLIC BLOOD PRESSURE: 124 MMHG | DIASTOLIC BLOOD PRESSURE: 76 MMHG | HEART RATE: 87 BPM | TEMPERATURE: 96.5 F | OXYGEN SATURATION: 97 %

## 2025-04-08 DIAGNOSIS — N20.0 CALCULUS OF KIDNEY: ICD-10-CM

## 2025-04-08 PROCEDURE — 99213 OFFICE O/P EST LOW 20 MIN: CPT

## 2025-04-08 PROCEDURE — G2211 COMPLEX E/M VISIT ADD ON: CPT | Mod: NC

## 2025-04-08 PROCEDURE — 76775 US EXAM ABDO BACK WALL LIM: CPT

## 2025-05-19 NOTE — H&P PST ADULT - BP NONINVASIVE MEAN (MM HG)
Patient Quality Outreach    Patient is due for the following:   Hypertension -  BP check  Physical Preventive Adult Physical    Action(s) Taken:   Schedule a Adult Preventative    Type of outreach:    Sent letter.    Questions for provider review:    None         Ayala BRENNER LPN  Chart routed to None.         102

## 2025-07-24 NOTE — ASU PATIENT PROFILE, ADULT - HAVE YOU HAD COVID IN THE LAST 60 DAYS?
Problem: Discharge Planning  Goal: Discharge to home or other facility with appropriate resources  Outcome: Progressing     Problem: Safety - Adult  Goal: Free from fall injury  Outcome: Progressing     Problem: Pain  Goal: Verbalizes/displays adequate comfort level or baseline comfort level  Outcome: Progressing     Problem: Gastrointestinal - Adult  Goal: Minimal or absence of nausea and vomiting  Outcome: Progressing     Problem: Gastrointestinal - Adult  Goal: Maintains or returns to baseline bowel function  Outcome: Progressing     Problem: Gastrointestinal - Adult  Goal: Maintains adequate nutritional intake  Outcome: Progressing      No

## (undated) DEVICE — VENODYNE/SCD SLEEVE CALF MEDIUM

## (undated) DEVICE — PRESSURE INFUSOR BAG 1000ML

## (undated) DEVICE — LABELS BLANK W PEN

## (undated) DEVICE — TUBING SUCTION NONCONDUCTIVE 6MM X 12FT

## (undated) DEVICE — DRSG TELFA 3 X 8

## (undated) DEVICE — TUBING IRR SET FOR CYSTOSCOPY 77"

## (undated) DEVICE — PROTECTOR HEEL / ELBOW FLUFFY

## (undated) DEVICE — BASIN SET DOUBLE

## (undated) DEVICE — GOWN XL

## (undated) DEVICE — GLV 6.5 PROTEXIS (CREAM) MICRO

## (undated) DEVICE — PREP BETADINE SPONGE STICKS

## (undated) DEVICE — GOWN LG

## (undated) DEVICE — PACK PERI GYN

## (undated) DEVICE — SOL IRR POUR NS 0.9% 1000ML

## (undated) DEVICE — DRAPE LIGHT HANDLE COVER (GREEN)

## (undated) DEVICE — GLV 7 PROTEXIS (CREAM) MICRO

## (undated) DEVICE — SOL IRR POUR H2O 500ML

## (undated) DEVICE — DRAPE IRRIGATION POUCH 19X23"

## (undated) DEVICE — VISITEC 4X4

## (undated) DEVICE — POSITIONER STRAP ARMBOARD VELCRO TS-30